# Patient Record
Sex: FEMALE | Race: WHITE | Employment: FULL TIME | URBAN - METROPOLITAN AREA
[De-identification: names, ages, dates, MRNs, and addresses within clinical notes are randomized per-mention and may not be internally consistent; named-entity substitution may affect disease eponyms.]

---

## 2017-04-12 ENCOUNTER — TELEPHONE (OUTPATIENT)
Dept: SURGERY | Facility: CLINIC | Age: 32
End: 2017-04-12

## 2017-04-12 NOTE — TELEPHONE ENCOUNTER
Linda is calling for Linh Bueno.  She reports Linh has treated her twice in the past for anal fissures.  Most recently June last year.  At that time was on diltiazem for 6 weeks and issue resolved.  She feels as of yesterday that her anal fissure is back.  She is requesting another Rx for diltiazem.  She's willing to come to clinic for appointment if need be, but she's confident that's what it is.    Will route to Linh Gomes's clinic nurse.

## 2017-04-13 DIAGNOSIS — K60.2 ANAL FISSURE: ICD-10-CM

## 2017-04-13 NOTE — TELEPHONE ENCOUNTER
Attempt to call patient, no answer, LM for patient that refill for her Diltiazem was sent to clinic pharmacy here ([address] [facility]). Patient to call back if she has any questions/concerns or if she would like rx to be sent somewhere else.   Maria L RODRIGUEZ LPN

## 2017-04-14 ENCOUNTER — TELEPHONE (OUTPATIENT)
Dept: SURGERY | Facility: CLINIC | Age: 32
End: 2017-04-14

## 2017-04-14 NOTE — TELEPHONE ENCOUNTER
Linda Madrid is a patient of URBANO Bullard last seen 06/14/16. Refill for Diltiazem sent to pharmacy 4/13/17 for current anal fisure. Patient is in contact with triage line with complaints of a rash that is located on her chest and bottom lip. Patient denies any other changes in daily routine and thinks this is due to the diltiazem cream. Bottom lip rash is raised and red, chest area is flat and itchy. She denies any signs of SOB or other complications at this time. Advised patient to stop using diltiazem, if she develops any worsening symptoms she should go into ED. Patient is scheduled to see URBANO Reynolds 4/18/17. Advised patient in the meantime to regulate BM, take sitz bath etc for comfort. She is in agreement with this plan and will call with any further questions or concerns.

## 2017-04-18 ENCOUNTER — OFFICE VISIT (OUTPATIENT)
Dept: SURGERY | Facility: CLINIC | Age: 32
End: 2017-04-18

## 2017-04-18 VITALS
OXYGEN SATURATION: 98 % | TEMPERATURE: 97.8 F | SYSTOLIC BLOOD PRESSURE: 116 MMHG | BODY MASS INDEX: 22.4 KG/M2 | DIASTOLIC BLOOD PRESSURE: 75 MMHG | HEIGHT: 66 IN | WEIGHT: 139.4 LBS | HEART RATE: 82 BPM

## 2017-04-18 DIAGNOSIS — K60.2 ANAL FISSURE: Primary | ICD-10-CM

## 2017-04-18 ASSESSMENT — PAIN SCALES - GENERAL: PAINLEVEL: MODERATE PAIN (4)

## 2017-04-18 NOTE — LETTER
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 She presents today for recurrent fissure.    Linda had been doing well until she developed another anal fissure a few weeks ago. She again has sharp pain with bowel movements. She uses Miralax at least twice a day to keep stools soft and has not had any hard stools. 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 She can feel a small bump on the outside of her anus. She got a refill from me for diltiazem last week. However, after three days of use, she developed hives on her chest. She was seen by her dermatologist who recommended stopping diltiazem. Hives have since resolved.   She denies any family history of any colon cancer. She has had a few colonoscopies in the past with the last in  with biopsies at that time.    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 On exam she has a posterior midline anal fissure. She also has a few small external hemorrhoids as well. Discussed trying a different topical medication as she has healed well with topical therapy in the past versus botox injections. She would like to try another topical medications. I am unsure if she reacted to the diltiazem or the lipovan it was compounded in. Will have her try nifedipine in lidocaine. If she tolerates this well, would like her to use three times a day for 8 weeks and return after 8 weeks if still symptomatic. If she does not tolerate, will consider Botox injections.  Discussed the risks of pain, infection, leakage of stool, mucous, or flatus, and continued risk of recurrent fissures with Botox injections. She states an understanding of this. Advised continued use of Miralax if this is controlling her constipation and she can also follow up with gastroenterology as needed. Advised not holding bowel movements for long periods of time if possible. Patient's questions were answered to her stated satisfaction and she is in agreement with this plan.    Answers for HPI/ROS submitted by the patient on 3/2/2016   General Symptoms: No  Skin Symptoms: No  HENT Symptoms: No  EYE SYMPTOMS: No  HEART SYMPTOMS: No  LUNG SYMPTOMS: No  INTESTINAL SYMPTOMS: Yes  URINARY SYMPTOMS: No  GYNECOLOGIC SYMPTOMS: No  BREAST SYMPTOMS: No  SKELETAL SYMPTOMS: No  BLOOD SYMPTOMS: No  NERVOUS SYSTEM SYMPTOMS: No  MENTAL HEALTH SYMPTOMS: No  Heart burn or indigestion: No  Nausea: No  Vomiting: No  Abdominal pain: No  Bloating: Yes  Constipation: No  Diarrhea: Yes  Blood in stool: No  Black stools: No  Rectal/ Anal pain: Yes  Fecal incontinence: No  Rectal bleeding: Yes  Yellowing of skin or eyes: No  Vomit with blood: No  Change in stools: No  Hemorrhoids: Yes      Medical history:  No past medical history on file.    Surgical history:  No past surgical history on file.    Problem list:    Patient Active Problem List    Diagnosis Date Noted     Slow transit constipation 06/22/2016     Priority: Medium       Medications:  Current Outpatient Prescriptions   Medication Sig Dispense Refill     OMEPRAZOLE PO        COMPOUND (CMPD RX) - PHARMACY TO MIX COMPOUNDED MEDICATION Apply a pea sized amount three times a day for 8 weeks. 30 g 0     diltiazem 2% in PLO cream, FV COMPOUNDED, 2% GEL To anal opening three times daily.  Use a pea-sized amount.  Store at room temperature. 60 g 2     Cetirizine HCl (ZYRTEC ALLERGY PO) Take by mouth daily PRN       dicyclomine (BENTYL) 10 MG capsule Take 1-2 capsules (10-20 mg) by  "mouth 2 times daily as needed 40 capsule 3     hyoscyamine (ANASPAZ,LEVSIN) 0.125 MG tablet Take 1-2 tablets (125-250 mcg) by mouth every 4 hours as needed for cramping 40 tablet 1     linaclotide (LINZESS) 290 MCG capsule Take 1 capsule (290 mcg) by mouth every morning (before breakfast) 30 capsule 1     polyethylene glycol (MIRALAX) powder Take 2 capfuls by mouth daily        Fexofenadine HCl (ALLEGRA PO) Take by mouth daily       FLUTICASONE PROPIONATE, NASAL, NA        JUNEL FE 1.5/30 1.5-30 MG-MCG OR TABS 1 TABLET DAILY       ALBUTEROL 90 MCG/ACT IN AERS PRN         Allergies:  Allergies   Allergen Reactions     Milk Protein Extract Other (See Comments)     Intense pain within 15 minutes.      Ancef [Cefazolin Sodium]      Penicillins      Shellfish Allergy      Vicodin [Hydrocodone-Acetaminophen]        Family history:  Family History   Problem Relation Age of Onset     Cancer - colorectal No family hx of      Other - See Comments No family hx of      autoimmune disease       Social history:  Social History   Substance Use Topics     Smoking status: Never Smoker     Smokeless tobacco: Not on file     Alcohol use Yes    Marital status: single.  Occupation: Speech pathologist.    Nursing Notes:   Mireya Beltran LPN  4/18/2017  8:10 AM  Signed  Chief Complaint   Patient presents with     Clinic Care Coordination - Follow-up     return       Vitals:    04/18/17 0808   BP: 116/75   Pulse: 82   Temp: 97.8  F (36.6  C)   TempSrc: Oral   SpO2: 98%   Weight: 139 lb 6.4 oz   Height: 5' 6\"       Body mass index is 22.5 kg/(m^2).    Mireya PHIPPS LPN                          Physical Examination:  /75  Pulse 82  Temp 97.8  F (36.6  C) (Oral)  Ht 5' 6\"  Wt 139 lb 6.4 oz  SpO2 98%  BMI 22.5 kg/m2  General: Alert, oriented, in no acute distress but appears uncomfortable  HEENT: Mucous membranes moist  Perianal external examination:  Perianal skin: Intact with no excoriation or lichenification.  Lesions: No evidence of " an external lesion, nodularity, or induration in the perianal region.  Eversion of buttocks: There was evidence of an anal fissure. Details: Posterior midline anal fissure  Skin tags or external hemorrhoids: Yes: Soft, edematous hemorrhoid in the left posterior and right anterior position without bleeding or thrombosis.  Digital rectal examination: Was deferred in order not to cause further trauma.    Anoscopy: Was deferred in order not to cause further trauma.    Total face to face time was 15 minutes, >50% counseling.    HELEN Freitas, NP-C  Colon and Rectal Surgery   [facility]        HELEN Freitas CNP

## 2017-04-18 NOTE — NURSING NOTE
"Chief Complaint   Patient presents with     Clinic Care Coordination - Follow-up     return       Vitals:    04/18/17 0808   BP: 116/75   Pulse: 82   Temp: 97.8  F (36.6  C)   TempSrc: Oral   SpO2: 98%   Weight: 139 lb 6.4 oz   Height: 5' 6\"       Body mass index is 22.5 kg/(m^2).    Mireya PHIPPS LPN                       "

## 2017-04-18 NOTE — PROGRESS NOTES
Colon and Rectal Surgery Follow Up Clinic Note    Referring provider:  Juanita Rdz MD  [facility]  [address]  [place] [address]     RE: Linda Madrid  : 1985  KEVIN: 2017    Linda Madrid is a very pleasant 30 year old female with recurrent anal fissure who was seen last in 2016. She presents today for recurrent fissure.    Linda had been doing well until she developed another anal fissure a few weeks ago. She again has sharp pain with bowel movements. She uses Miralax at least twice a day to keep stools soft and has not had any hard stools. 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 She can feel a small bump on the outside of her anus. She got a refill from me for diltiazem last week. However, after three days of use, she developed hives on her chest. She was seen by her dermatologist who recommended stopping diltiazem. Hives have since resolved.   She denies any family history of any colon cancer. She has had a few colonoscopies in the past with the last in  with biopsies at that time.    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 On exam she has a posterior midline anal fissure. She also has a few small external hemorrhoids as well. Discussed trying a different topical medication as she has healed well with topical therapy in the past versus botox injections. She would like to try another topical medications. I am unsure if she reacted to the diltiazem or the lipovan it was compounded in. Will have her try nifedipine in lidocaine. If she tolerates this well, would like her to use three times a day for 8 weeks and return after 8 weeks if still symptomatic. If she does not tolerate, will consider Botox injections. Discussed the risks of pain, infection, leakage of stool, mucous, or flatus, and continued  risk of recurrent fissures with Botox injections. She states an understanding of this. Advised continued use of Miralax if this is controlling her constipation and she can also follow up with gastroenterology as needed. Advised not holding bowel movements for long periods of time if possible. Patient's questions were answered to her stated satisfaction and she is in agreement with this plan.    Answers for HPI/ROS submitted by the patient on 3/2/2016   General Symptoms: No  Skin Symptoms: No  HENT Symptoms: No  EYE SYMPTOMS: No  HEART SYMPTOMS: No  LUNG SYMPTOMS: No  INTESTINAL SYMPTOMS: Yes  URINARY SYMPTOMS: No  GYNECOLOGIC SYMPTOMS: No  BREAST SYMPTOMS: No  SKELETAL SYMPTOMS: No  BLOOD SYMPTOMS: No  NERVOUS SYSTEM SYMPTOMS: No  MENTAL HEALTH SYMPTOMS: No  Heart burn or indigestion: No  Nausea: No  Vomiting: No  Abdominal pain: No  Bloating: Yes  Constipation: No  Diarrhea: Yes  Blood in stool: No  Black stools: No  Rectal/ Anal pain: Yes  Fecal incontinence: No  Rectal bleeding: Yes  Yellowing of skin or eyes: No  Vomit with blood: No  Change in stools: No  Hemorrhoids: Yes      Medical history:  No past medical history on file.    Surgical history:  No past surgical history on file.    Problem list:    Patient Active Problem List    Diagnosis Date Noted     Slow transit constipation 06/22/2016     Priority: Medium       Medications:  Current Outpatient Prescriptions   Medication Sig Dispense Refill     OMEPRAZOLE PO        COMPOUND (CMPD RX) - PHARMACY TO MIX COMPOUNDED MEDICATION Apply a pea sized amount three times a day for 8 weeks. 30 g 0     diltiazem 2% in PLO cream, FV COMPOUNDED, 2% GEL To anal opening three times daily.  Use a pea-sized amount.  Store at room temperature. 60 g 2     Cetirizine HCl (ZYRTEC ALLERGY PO) Take by mouth daily PRN       dicyclomine (BENTYL) 10 MG capsule Take 1-2 capsules (10-20 mg) by mouth 2 times daily as needed 40 capsule 3     hyoscyamine (ANASPAZ,LEVSIN) 0.125 MG tablet  "Take 1-2 tablets (125-250 mcg) by mouth every 4 hours as needed for cramping 40 tablet 1     linaclotide (LINZESS) 290 MCG capsule Take 1 capsule (290 mcg) by mouth every morning (before breakfast) 30 capsule 1     polyethylene glycol (MIRALAX) powder Take 2 capfuls by mouth daily        Fexofenadine HCl (ALLEGRA PO) Take by mouth daily       FLUTICASONE PROPIONATE, NASAL, NA        JUNEL FE 1.5/30 1.5-30 MG-MCG OR TABS 1 TABLET DAILY       ALBUTEROL 90 MCG/ACT IN AERS PRN         Allergies:  Allergies   Allergen Reactions     Milk Protein Extract Other (See Comments)     Intense pain within 15 minutes.      Ancef [Cefazolin Sodium]      Penicillins      Shellfish Allergy      Vicodin [Hydrocodone-Acetaminophen]        Family history:  Family History   Problem Relation Age of Onset     Cancer - colorectal No family hx of      Other - See Comments No family hx of      autoimmune disease       Social history:  Social History   Substance Use Topics     Smoking status: Never Smoker     Smokeless tobacco: Not on file     Alcohol use Yes    Marital status: single.  Occupation: Speech pathologist.    Nursing Notes:   Mireya Beltran LPN  4/18/2017  8:10 AM  Signed  Chief Complaint   Patient presents with     Clinic Care Coordination - Follow-up     return       Vitals:    04/18/17 0808   BP: 116/75   Pulse: 82   Temp: 97.8  F (36.6  C)   TempSrc: Oral   SpO2: 98%   Weight: 139 lb 6.4 oz   Height: 5' 6\"       Body mass index is 22.5 kg/(m^2).    Mireya PHIPPS LPN                          Physical Examination:  /75  Pulse 82  Temp 97.8  F (36.6  C) (Oral)  Ht 5' 6\"  Wt 139 lb 6.4 oz  SpO2 98%  BMI 22.5 kg/m2  General: Alert, oriented, in no acute distress but appears uncomfortable  HEENT: Mucous membranes moist  Perianal external examination:  Perianal skin: Intact with no excoriation or lichenification.  Lesions: No evidence of an external lesion, nodularity, or induration in the perianal region.  Eversion of " buttocks: There was evidence of an anal fissure. Details: Posterior midline anal fissure  Skin tags or external hemorrhoids: Yes: Soft, edematous hemorrhoid in the left posterior and right anterior position without bleeding or thrombosis.  Digital rectal examination: Was deferred in order not to cause further trauma.    Anoscopy: Was deferred in order not to cause further trauma.    Total face to face time was 15 minutes, >50% counseling.    HELEN Freitas, NP-C  Colon and Rectal Surgery   [facility]

## 2017-04-18 NOTE — MR AVS SNAPSHOT
"              After Visit Summary   4/18/2017    Linda Madrid    MRN: [id number]           Patient Information     Date Of Birth          1985        Visit Information        Provider Department      4/18/2017 8:00 AM Linh Gómez APRN [facility] Colon and Rectal Surgery        Today's Diagnoses     Anal fissure    -  1       Follow-ups after your visit        Who to contact     Please call your clinic at [phone] to:    Ask questions about your health    Make or cancel appointments    Discuss your medicines    Learn about your test results    Speak to your doctor   If you have compliments or concerns about an experience at your clinic, or if you wish to file a complaint, please contact [facility] Patient Relations at [phone] or email us at [email or web address]         Additional Information About Your Visit        MyChart Information     [employer] gives you secure access to your electronic health record. If you see a primary care provider, you can also send messages to your care team and make appointments. If you have questions, please call your primary care clinic.  If you do not have a primary care provider, please call [phone] and they will assist you.      [employer] is an electronic gateway that provides easy, online access to your medical records. With [employer], you can request a clinic appointment, read your test results, renew a prescription or communicate with your care team.     To access your existing account, please contact your [facility] Clinic or call [phone] for assistance.        Care EveryWhere ID     This is your Care EveryWhere ID. This could be used by other organizations to access your [place] medical records  [id number]        Your Vitals Were     Pulse Temperature Height Pulse Oximetry BMI (Body Mass Index)       82 97.8  F (36.6  C) (Oral) 5' 6\" 98% 22.5 kg/m2        Blood Pressure " from Last 3 Encounters:   04/18/17 116/75   06/22/16 104/68   06/14/16 105/71    Weight from Last 3 Encounters:   04/18/17 139 lb 6.4 oz   06/22/16 136 lb   06/14/16 134 lb 9.6 oz              Today, you had the following     No orders found for display         Today's Medication Changes          These changes are accurate as of: 4/18/17  8:50 AM.  If you have any questions, ask your nurse or doctor.               Start taking these medicines.        Dose/Directions    COMPOUND - PHARMACY TO MIX COMPOUNDED MEDICATION   Commonly known as:  CMPD RX   Used for:  Anal fissure   Started by:  Linh Gómez APRN CNP        Apply a pea sized amount three times a day for 8 weeks.   Quantity:  30 g   Refills:  0            Where to get your medicines      These medications were sent to [facility] [address] [place] [address] [address] [address]    Hours:  TRANSPLANT PHONE NUMBER [phone] Phone:  [phone]     COMPOUND - PHARMACY TO MIX COMPOUNDED MEDICATION                Primary Care Provider Office Phone # Fax #    Juanita Rdz [phone]       [facility] [address] [place] [address] [address]  [facility] [address]        Thank you!     Thank you for choosing [facility] COLON AND RECTAL SURGERY  for your care. Our goal is always to provide you with excellent care. Hearing back from our patients is one way we can continue to improve our services. Please take a few minutes to complete the written survey that you may receive in the mail after your visit with us. Thank you!             Your Updated Medication List - Protect others around you: Learn how to safely use, store and throw away your medicines at www.disposemymeds.org.          This list is accurate as of: 4/18/17  8:50 AM.  Always use your most recent med list.                   Brand Name Dispense Instructions for use    albuterol 90 MCG/ACT inhaler       PRN       ALLEGRA PO      Take by mouth daily       COMPOUND - PHARMACY TO MIX COMPOUNDED MEDICATION    CMPD RX    30 g    Apply a pea sized amount three times a day for 8 weeks.       dicyclomine 10 MG capsule    BENTYL    40 capsule    Take 1-2 capsules (10-20 mg) by mouth 2 times daily as needed       diltiazem 2% in PLO cream (FV COMPOUNDED) 2% Gel     60 g    To anal opening three times daily.  Use a pea-sized amount.  Store at room temperature.       FLUTICASONE PROPIONATE (NASAL) NA          hyoscyamine 0.125 MG tablet    ANASPAZ/LEVSIN    40 tablet    Take 1-2 tablets (125-250 mcg) by mouth every 4 hours as needed for cramping       JUNEL FE 1.5/30 1.5-30 MG-MCG per tablet   Generic drug:  norethindrone-ethinyl estradiol-iron      1 TABLET DAILY       linaclotide 290 MCG capsule    LINZESS    30 capsule    Take 1 capsule (290 mcg) by mouth every morning (before breakfast)       MIRALAX powder   Generic drug:  polyethylene glycol      Take 2 capfuls by mouth daily       OMEPRAZOLE PO          ZYRTEC ALLERGY PO      Take by mouth daily PRN

## 2017-06-24 ENCOUNTER — HEALTH MAINTENANCE LETTER (OUTPATIENT)
Age: 32
End: 2017-06-24

## 2017-12-05 ENCOUNTER — TRANSFERRED RECORDS (OUTPATIENT)
Dept: HEALTH INFORMATION MANAGEMENT | Facility: CLINIC | Age: 32
End: 2017-12-05

## 2018-12-12 ENCOUNTER — TELEPHONE (OUTPATIENT)
Dept: SURGERY | Facility: CLINIC | Age: 33
End: 2018-12-12

## 2018-12-12 DIAGNOSIS — K60.2 ANAL FISSURE: ICD-10-CM

## 2018-12-12 NOTE — TELEPHONE ENCOUNTER
[facility] Call Center    Phone Message    May a detailed message be left on voicemail: yes    Reason for Call: Medication Question or concern regarding medication   Prescription Clarification  Name of Medication: diltiazem 2% in PLO cream, FV COMPOUNDED, 2% GEL or Compound CMPD RX  Prescribing Provider: Aly Conte   Pharmacy:    What on the order needs clarification? Pt has had the same symptoms as in 2017 and is wondering if a script can be filled for her since she is going out of town. She said she was put on one cream that she had a reaction to and then put on another. Not sure if the above two were them?          Action Taken: Message routed to:  [facility] ([facility] Please call the Pt to discuss and confirm

## 2018-12-20 NOTE — TELEPHONE ENCOUNTER
Patient return call, discuss with patient her prescription for the diltiazem cream. Gave patient the option to have compound medication mixed in with Lipovan cream or White pet to be cheaper in cost. Patient says the cost they quoted her was $180.    Called [facility], if mixed with lipovan cream it's about $72 and white petroleum is $39.     Patient states she doesn't mind trying the white pet. Inform her, I will call to change the prescription to white petroleum.     Called [facility] pharmacy to change RX, they will contact patient to set up payment and mail out prescription to her.     Regarding her other symptoms, appointment made for her to see Linh Bueno NP on January 16, 2019 @ 6:30pm. Patient advise if symptoms becomes worst, she starts having fever/chills/nausea/vomiting, to call us back or go to ER. Patient states understanding of plan.     Maria L RODRIGUEZ LPN

## 2018-12-20 NOTE — TELEPHONE ENCOUNTER
[facility] Call Center    Phone Message    May a detailed message be left on voicemail: yes    Reason for Call: Other: Pt calling to say that her insurance does not cover the compound and needs a different Rx.  She is also having new symptoms:  as of yesterday, a lot of blood and blood clots.  today is light bleeding.  Please call her back as soon possible to discuss .  She is also wondering if she should be seen due to the clotting    Action Taken: Message routed to:  [facility] ([facility]): NILDA MNEDOZA

## 2018-12-20 NOTE — TELEPHONE ENCOUNTER
Called patient, no answer, LM for patient stating majority of the time, insurance will not cover the diltiazem cream because of the indication of the medication. A PA can be submitted to see if they will cover, but she may have to pay out of pocket for the medication. Also regarding her symptoms, she should be seen due to not being seen for over a year and her symptoms are new. Patient to call back and discuss and to schedule an appointment to see Linh Bueno NP.    Maria L RODRIGUEZ LPN

## 2018-12-26 ENCOUNTER — TELEPHONE (OUTPATIENT)
Dept: SURGERY | Facility: CLINIC | Age: 33
End: 2018-12-26

## 2019-01-15 ENCOUNTER — TELEPHONE (OUTPATIENT)
Dept: SURGERY | Facility: CLINIC | Age: 34
End: 2019-01-15

## 2019-01-15 NOTE — TELEPHONE ENCOUNTER
Called patient to offer earlier appointment time at 5:45 instead of 6:30.Patient answered phone call and details of future appointment were discussed. The patient stated that they are in a meeting until 6:00 and cannot make it any earlier.   Rebecca English, EMT

## 2019-01-16 ENCOUNTER — OFFICE VISIT (OUTPATIENT)
Dept: SURGERY | Facility: CLINIC | Age: 34
End: 2019-01-16
Payer: COMMERCIAL

## 2019-01-16 DIAGNOSIS — K59.09 OTHER CONSTIPATION: ICD-10-CM

## 2019-01-16 DIAGNOSIS — K60.2 ANAL FISSURE: Primary | ICD-10-CM

## 2019-01-16 NOTE — PROGRESS NOTES
Colon and Rectal Surgery Follow Up Clinic Note    Referring provider:  Juanita Rdz MD  [facility]  [address]  [place] [address]     RE: Linda Madrid  : 1985  KEVIN: 2019    Linda Madrid is a very pleasant 30 year old female with recurrent anal fissure who was seen last in 2017. She presents today for recurrent fissure.    Linda had been doing well until she developed another anal fissure about a week before Thanks. She again has sharp pain with bowel movements.  She contacted our clinic and we represcribed topical therapy.  She did not tolerate diltiazem previously so needed nifedipine, which she has been tolerating well.  She has been using this 3 times a day since the end of December and over the past 3 days her pain has improved.  She has had some bright red blood and noticed blood clots twice but this has improved.  She continues to have a urgency and significant constipation.  She has seen Sirisha Veronica NP in the past and [facility] for this.  She has tried Linzess and is on daily MiraLAX but continues to have significant constipation.    She denies any family history of any colon cancer. She has had a few colonoscopies in the past with the last in  with biopsies at that time.    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 On exam she has a posterior midline anal fissure.  Discussed that she likely continues to get anal fissures because of her uncontrolled constipation.  I would like her to meet with gastroenterology here to try to manage this better.  We will have her continue on topical nifedipine cream as her symptoms are improving.  If she fails to heal with topical therapy, can consider Botox injections.  We will have her return to clinic in 6 weeks for any persistent symptoms.  She is concerned that she also has internal hemorrhoids that there may prolapse a little with bowel movements.  We can  evaluate at that time for possible internal hemorrhoids if she remains symptomatic. Patient's questions were answered to her stated satisfaction and she is in agreement with this plan.    Answers for HPI/ROS submitted by the patient on 3/2/2016   General Symptoms: No  Skin Symptoms: No  HENT Symptoms: No  EYE SYMPTOMS: No  HEART SYMPTOMS: No  LUNG SYMPTOMS: No  INTESTINAL SYMPTOMS: Yes  URINARY SYMPTOMS: No  GYNECOLOGIC SYMPTOMS: No  BREAST SYMPTOMS: No  SKELETAL SYMPTOMS: No  BLOOD SYMPTOMS: No  NERVOUS SYSTEM SYMPTOMS: No  MENTAL HEALTH SYMPTOMS: No  Heart burn or indigestion: No  Nausea: No  Vomiting: No  Abdominal pain: No  Bloating: Yes  Constipation: No  Diarrhea: Yes  Blood in stool: No  Black stools: No  Rectal/ Anal pain: Yes  Fecal incontinence: No  Rectal bleeding: Yes  Yellowing of skin or eyes: No  Vomit with blood: No  Change in stools: No  Hemorrhoids: Yes      Medical history:  No past medical history on file.    Surgical history:  No past surgical history on file.    Problem list:    Patient Active Problem List    Diagnosis Date Noted     Slow transit constipation 06/22/2016     Priority: Medium       Medications:  Current Outpatient Medications   Medication Sig Dispense Refill     ALBUTEROL 90 MCG/ACT IN AERS PRN       Cetirizine HCl (ZYRTEC ALLERGY PO) Take by mouth daily PRN       COMPOUND (CMPD RX) - PHARMACY TO MIX COMPOUNDED MEDICATION Apply a pea sized amount three times a day for 8 weeks. 30 g 0     dicyclomine (BENTYL) 10 MG capsule Take 1-2 capsules (10-20 mg) by mouth 2 times daily as needed 40 capsule 3     diltiazem 2% in PLO cream, FV COMPOUNDED, 2% GEL To anal opening three times daily.  Use a pea-sized amount.  Store at room temperature. 60 g 2     Fexofenadine HCl (ALLEGRA PO) Take by mouth daily       FLUTICASONE PROPIONATE, NASAL, NA        hyoscyamine (ANASPAZ,LEVSIN) 0.125 MG tablet Take 1-2 tablets (125-250 mcg) by mouth every 4 hours as needed for cramping 40 tablet 1      JUNEL FE 1.5/30 1.5-30 MG-MCG OR TABS 1 TABLET DAILY       linaclotide (LINZESS) 290 MCG capsule Take 1 capsule (290 mcg) by mouth every morning (before breakfast) 30 capsule 1     OMEPRAZOLE PO        polyethylene glycol (MIRALAX) powder Take 2 capfuls by mouth daily          Allergies:  Allergies   Allergen Reactions     Milk Protein Extract Other (See Comments)     Intense pain within 15 minutes.      Ancef [Cefazolin Sodium]      Penicillins      Shellfish Allergy      Vicodin [Hydrocodone-Acetaminophen]        Family history:  Family History   Problem Relation Age of Onset     Cancer - colorectal No family hx of      Other - See Comments No family hx of         autoimmune disease       Social history:  Social History     Tobacco Use     Smoking status: Never Smoker   Substance Use Topics     Alcohol use: Yes    Marital status: single.  Occupation: Speech pathologist.    Nursing Notes:   Rebecca English EMT  1/16/2019  6:47 PM  Signed  Chief Complaint   Patient presents with     Rectal Problem     Rectal bleeding, hx of anal fissure.       There were no vitals filed for this visit.  Per provider.   There is no height or weight on file to calculate BMI.     MARYJANE Tony                         Physical Examination:  There were no vitals taken for this visit.  General: Alert, oriented, in no acute distress but appears uncomfortable  HEENT: Mucous membranes moist  Perianal external examination:  Perianal skin: Intact with no excoriation or lichenification.  Lesions: No evidence of an external lesion, nodularity, or induration in the perianal region.  Eversion of buttocks: There was evidence of an anal fissure. Details: Posterior midline anal fissure  Skin tags or external hemorrhoids: Small external skin tag in the left anterior position  Digital rectal examination: Was deferred in order not to cause further trauma.    Anoscopy: Was deferred in order not to cause further trauma.    Total face to face  time was 25 minutes, >50% counseling.    HELEN Freitas, NP-C  Colon and Rectal Surgery   [facility]

## 2019-01-16 NOTE — LETTER
2019       RE: Linda Madrid  [address]  [facility] [address]     Dear Colleague,    Thank you for referring your patient, Linda Madrid, to the [facility] COLON AND RECTAL SURGERY at [facility]. Please see a copy of my visit note below.    Colon and Rectal Surgery Follow Up Clinic Note    Referring provider:  Juanita Rdz MD  [facility]  [address]  [place] [address]     RE: Linda Madrid  : 1985  KEVIN: 2019    Linda Madrid is a very pleasant 30 year old female with recurrent anal fissure who was seen last in 2017. She presents today for recurrent fissure.    Linda had been doing well until she developed another anal fissure about a week before . She again has sharp pain with bowel movements.  She contacted our clinic and we represcribed topical therapy.  She did not tolerate diltiazem previously so needed nifedipine, which she has been tolerating well.  She has been using this 3 times a day since the end of December and over the past 3 days her pain has improved.  She has had some bright red blood and noticed blood clots twice but this has improved.  She continues to have a urgency and significant constipation.  She has seen Sirisha Veronica NP in the past and [facility] for this.  She has tried Linzess and is on daily MiraLAX but continues to have significant constipation.    She denies any family history of any colon cancer. She has had a few colonoscopies in the past with the last in  with biopsies at that time.    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 On exam she has a posterior midline anal fissure.  Discussed that she likely continues to get anal fissures because of her uncontrolled constipation.  I would like her to meet with gastroenterology here to try to manage this better.  We will have her continue on topical nifedipine cream as her  symptoms are improving.  If she fails to heal with topical therapy, can consider Botox injections.  We will have her return to clinic in 6 weeks for any persistent symptoms.  She is concerned that she also has internal hemorrhoids that there may prolapse a little with bowel movements.  We can evaluate at that time for possible internal hemorrhoids if she remains symptomatic. Patient's questions were answered to her stated satisfaction and she is in agreement with this plan.    Medical history:  No past medical history on file.    Surgical history:  No past surgical history on file.    Problem list:    Patient Active Problem List    Diagnosis Date Noted     Slow transit constipation 06/22/2016     Priority: Medium       Medications:  Current Outpatient Medications   Medication Sig Dispense Refill     ALBUTEROL 90 MCG/ACT IN AERS PRN       Cetirizine HCl (ZYRTEC ALLERGY PO) Take by mouth daily PRN       COMPOUND (CMPD RX) - PHARMACY TO MIX COMPOUNDED MEDICATION Apply a pea sized amount three times a day for 8 weeks. 30 g 0     dicyclomine (BENTYL) 10 MG capsule Take 1-2 capsules (10-20 mg) by mouth 2 times daily as needed 40 capsule 3     diltiazem 2% in PLO cream, FV COMPOUNDED, 2% GEL To anal opening three times daily.  Use a pea-sized amount.  Store at room temperature. 60 g 2     Fexofenadine HCl (ALLEGRA PO) Take by mouth daily       FLUTICASONE PROPIONATE, NASAL, NA        hyoscyamine (ANASPAZ,LEVSIN) 0.125 MG tablet Take 1-2 tablets (125-250 mcg) by mouth every 4 hours as needed for cramping 40 tablet 1     JUNEL FE 1.5/30 1.5-30 MG-MCG OR TABS 1 TABLET DAILY       linaclotide (LINZESS) 290 MCG capsule Take 1 capsule (290 mcg) by mouth every morning (before breakfast) 30 capsule 1     OMEPRAZOLE PO        polyethylene glycol (MIRALAX) powder Take 2 capfuls by mouth daily          Allergies:  Allergies   Allergen Reactions     Milk Protein Extract Other (See Comments)     Intense pain within 15 minutes.       Ancef [Cefazolin Sodium]      Penicillins      Shellfish Allergy      Vicodin [Hydrocodone-Acetaminophen]        Family history:  Family History   Problem Relation Age of Onset     Cancer - colorectal No family hx of      Other - See Comments No family hx of         autoimmune disease       Social history:  Social History     Tobacco Use     Smoking status: Never Smoker   Substance Use Topics     Alcohol use: Yes    Marital status: single.  Occupation: Speech pathologist.    Nursing Notes:   Rebecca English EMT  1/16/2019  6:47 PM  Signed  Chief Complaint   Patient presents with     Rectal Problem     Rectal bleeding, hx of anal fissure.       There were no vitals filed for this visit.  Per provider.   There is no height or weight on file to calculate BMI.     MARYJANE Tony                         Physical Examination:  There were no vitals taken for this visit.  General: Alert, oriented, in no acute distress but appears uncomfortable  HEENT: Mucous membranes moist  Perianal external examination:  Perianal skin: Intact with no excoriation or lichenification.  Lesions: No evidence of an external lesion, nodularity, or induration in the perianal region.  Eversion of buttocks: There was evidence of an anal fissure. Details: Posterior midline anal fissure  Skin tags or external hemorrhoids: Small external skin tag in the left anterior position  Digital rectal examination: Was deferred in order not to cause further trauma.    Anoscopy: Was deferred in order not to cause further trauma.    Total face to face time was 25 minutes, >50% counseling.    HELEN Freitas, NP-C  Colon and Rectal Surgery   [facility]

## 2019-01-17 NOTE — NURSING NOTE
Chief Complaint   Patient presents with     Rectal Problem     Rectal bleeding, hx of anal fissure.       There were no vitals filed for this visit.  Per provider.   There is no height or weight on file to calculate BMI.     Rebecca English, EMT

## 2019-01-25 ENCOUNTER — TELEPHONE (OUTPATIENT)
Dept: GASTROENTEROLOGY | Facility: CLINIC | Age: 34
End: 2019-01-25

## 2019-01-25 NOTE — TELEPHONE ENCOUNTER
FUTURE VISIT INFORMATION      FUTURE VISIT INFORMATION:    Date: 1/28/19    Time: 4PM    [place] [facility]  REFERRAL INFORMATION:    Referring provider:  Linh Lopez    Referring providers clinic:  [facility]    Reason for visit/diagnosis: IBS & Constipation    NOTES STATUS DETAILS   OFFICE NOTE from referring provider Internal 1/16/19, 4/18/17, 6/14/16, 3/2/16   OFFICE NOTE from other specialist Internal 6/22/16, 9/30/14, 7/22/14   DISCHARGE SUMMARY from hospital Internal    OPERATIVE REPORT N/A    MEDICATION LIST Internal         ENDOSCOPY  Internal 1/18/10, 10/2/08   COLONOSCOPY Internal 1/18/10, 10/2/08   ERCP N/A    EUS N/A    STOOL TESTING Internal    PERTINENT LABS Internal    PATHOLOGY REPORTS (RELATED) Internal    IMAGING (CT, MRI, EGD) Internal/Care Everywhere PACS     1/25/19: CLM with [facility] asking for CT and USs to be pushed. -Neelam  1/25/19: Sent fax to [facility] requesting records. -Neelam  1/28/19: Received records from [facility]

## 2019-01-28 ENCOUNTER — PRE VISIT (OUTPATIENT)
Dept: GASTROENTEROLOGY | Facility: CLINIC | Age: 34
End: 2019-01-28

## 2019-01-28 ENCOUNTER — OFFICE VISIT (OUTPATIENT)
Dept: GASTROENTEROLOGY | Facility: CLINIC | Age: 34
End: 2019-01-28
Payer: COMMERCIAL

## 2019-01-28 VITALS
DIASTOLIC BLOOD PRESSURE: 70 MMHG | BODY MASS INDEX: 24.09 KG/M2 | WEIGHT: 149.9 LBS | HEART RATE: 68 BPM | HEIGHT: 66 IN | SYSTOLIC BLOOD PRESSURE: 108 MMHG | OXYGEN SATURATION: 100 %

## 2019-01-28 DIAGNOSIS — K59.00 CONSTIPATION, UNSPECIFIED CONSTIPATION TYPE: Primary | ICD-10-CM

## 2019-01-28 DIAGNOSIS — R14.0 ABDOMINAL BLOATING: ICD-10-CM

## 2019-01-28 DIAGNOSIS — K59.00 CONSTIPATION, UNSPECIFIED CONSTIPATION TYPE: ICD-10-CM

## 2019-01-28 DIAGNOSIS — R10.9 ABDOMINAL DISCOMFORT: ICD-10-CM

## 2019-01-28 LAB — TSH SERPL DL<=0.005 MIU/L-ACNC: 1.89 MU/L (ref 0.4–4)

## 2019-01-28 RX ORDER — CALCIUM CARBONATE/VITAMIN D3 500-10/5ML
400 LIQUID (ML) ORAL DAILY
Qty: 90 CAPSULE | Refills: 3 | Status: SHIPPED | OUTPATIENT
Start: 2019-01-28 | End: 2020-01-28

## 2019-01-28 ASSESSMENT — ENCOUNTER SYMPTOMS
BLOOD IN STOOL: 1
HEARTBURN: 0
BOWEL INCONTINENCE: 0
DIARRHEA: 1
NAUSEA: 1
JAUNDICE: 0
BLOATING: 1
RECTAL PAIN: 1
CONSTIPATION: 1
ABDOMINAL PAIN: 1
VOMITING: 0

## 2019-01-28 ASSESSMENT — MIFFLIN-ST. JEOR: SCORE: 1401.69

## 2019-01-28 ASSESSMENT — PAIN SCALES - GENERAL: PAINLEVEL: NO PAIN (0)

## 2019-01-28 NOTE — PROGRESS NOTES
[facility]    CC/REFERRING MD:  Linh Ferrara  REASON FOR CONSULTATION: constipation     ASSESSMENT/PLAN:  Linda Madrid is a 33 year old woman with a past medical history of long-standing constipation and diagnosis of irritable bowel syndrome through [facility] in the [facility], anal fissure presenting today for follow-up.     1. Constipation, bloating, abdominal discomfort   Patient with long-standing constipation and GI symptoms likely caused by irritable bowel syndrome constipation.  Will ensure that patient has normal thyroid functioning today with TSH.  We discussed avenues for treating constipation including continuing to manage with MiraLAX 2-3 capfuls a day.  Patient is wondering about other options due to concern of cost and difficulty traveling with MiraLAX.  We discussed another option would be magnesium oxide which would be pill form and the patient is interested in trying at this point.  We will start the patient with magnesium oxide 400 mg a day while continuing MiraLAX, with plan to discontinue MiraLAX pending response.  She was instructed to contact our clinic with updates regarding magnesium oxide so that we can help titrate the medication.  Patient has been on Linzess in the past which she states worsens abdominal pain and did not improve stool form or consistency.  Has also tried Levsin and Bentyl which did not improve symptoms.  We discussed the other treatments for constipation in pill form included amitiza so which she has not been on.  Patient was given information regarding medication and we discussed potential side effects including loose stools.  Will discuss again at next visit.  Due to history of biofeedback therapy years ago, would be concerned that patient has underlying pelvic floor dysfunction.  I offered to refer the patient again for additional evaluation/treatment however the patient did not find biofeedback therapy helpful and would not like  to pursue this at this time. Patient can continue to follow dietary triggers that she has identified, with consideration of meeting with our dietitian if symptoms persist. Will discuss possible allergens at follow-up appointment.   -- check the TSH with labs   -- continue Miralax 2-3 capfuls a day  -- start magnesium oxide (pill form)- 400 mg a day and then back-off Miralax  -- can take up to 1000 mg a day of magnesium, call us with updates     2.  Rectal bleeding  Patient notes continued bright red blood per rectum associated with bowel movements.  Currently being treated by Linh from colorectal surgery.      3. Prior istory of Grade B esophagitis   Patient continues on omeprazole over the counter. Discussed that she could try to taper medication and monitor symptoms, otherwise will discuss this at follow-up appointment. Will contact our clinic if her symptoms worsen.      RTC 3 months    Thank you for this consultation. Patient discussed in brief with Dr. Chairez.   It was a pleasure to participate in the care of this patient; please contact us with any further questions.       Hailey Hunter PA-C  Division of Gastroenterology, Hepatology & Nutrition  [facility]        HPI  Linda Madrid is a 33 year old woman with a past medical history of long-standing constipation and diagnosis of irritable bowel syndrome through [facility] in the [facility], anal fissure presenting today for follow-up. She had previously been seen by Dr. Love and Prerna Bella NP and this is my first encounter with the patient.  Today the patient notes her entire life she has struggled with constipation and stated it would be common for her to go 1-2 weeks without a bowel movement. She has had workup in the past including pelvic floor evaluation and biofeedback therapy which she did not find helpful. She has also been seen by multiple providers including the [facility].  At her most recent visit in  2016, the patient was recommended to continue miralax and try linactolide in addition to miralax. Patient has also recently been seen by Linh Gómez for anal fissures which she has been treating with nifedipine with consideration of botox if symptoms worsen.     Previous workup has invluded to following:   TTG IgA normal 2008  EGD: normal esophagus, normal stomach, normal duodenum  Flex sig:  normal in 2008  Abdominal US 2016: mildl contracted gallbladder with minimal sludge  2016 EGD for abdominal pain- GRADE B esophagitis, normal stomach and duodneum- (told to continue dexilant)   HIDA scan: normal     Today the patient notes constant bloating and distention which she can sometimes improve with a bowel movement.  If she has a large bowel movement, sometimes she will have worsening cramping after bowel movement.  It can also improve with laying down.  She has tried dietary modifications and notes that dairy makes symptoms worse, and has identified certain food groups including polyols from the low 5 map diet also worsen symptoms.  She tries to eat smaller more frequent meals, avoidance, movements carbonated beverages, but notes that symptoms are worsening not improving.  In regards to her bowel movements she is currently taking 2-3 capfuls of MiraLAX a day and having Carter scale 6-7 bowel movements 1 time a day.  Patient reports that current dose of MiraLAX allows her to have daily bowel movements which prevents worsening abdominal discomfort due to constipation.  She denies any incontinence but notes some urgency with bowel movements.  She has been on this regimen for many years and has tried Linzess in the past which made abdominal pain worse and did not improve frequency or consistency.  She remained on MiraLAX while taking Linzess and was unable to wean off of MiraLAX..  She also notes a sore mouth and sharp abdominal pain when taken Dulcolax in the past.  She does not supplement her fiber, but  "notes she tries to eat fiber filled diet.  1-2 times a year if she feels constipated is worse, she will take magnesium citrate which will help clear her out.  Patient reports some blood due to history of hemorrhoids for which she has seen colorectal surgery.    The patient also notes an episode occurring the first week of December in which during intercourse she experiences stabbing pain in her left lower quadrant below her rib cage.  This was excruciatingly painful per patient, and she tried to walk and breathe to help her symptoms.  She became lightheaded and nauseated and \"passed out \"for 15 seconds.  After this, she had a urgent bowel movement when she went to the bathroom with diarrhea.  She was seen through OB/GYN for this with a pelvic ultrasound which was normal.  Similar symptom but a couple weeks ago however the patient stopped intercourse and felt better with time.    In regards to upper GI symptoms, she denies significant heartburn, dysphagia, nausea vomiting, odynophagia.  She continues to take omeprazole on a daily basis.      ROS:    No fevers or chills  No weight loss- cannot loose weight   No blurry vision, double vision or change in vision  No sore throat  No lymphadenopathy  No headache, paraesthesias, or weakness in a limb  + headaches- 2-3 times a week. Tries to take tylenol- can take some ibuprofen   No shortness of breath or wheezing  No chest pain or pressure  No arthralgias or myalgias  No rashes or skin changes  No odynophagia or dysphagia  No BRBPR, hematochezia, melena  No dysuria, frequency or urgency  No hot/cold intolerance or polyria  No anxiety or depression    PROBLEM LIST  Patient Active Problem List    Diagnosis Date Noted     Slow transit constipation 06/22/2016     Priority: Medium       PERTINENT PAST MEDICAL HISTORY:  Asthma, seasonal allergies  No past medical history on file.    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    ALLERGIES:  Allergies   Allergen Reactions     Milk Protein Extract Other (See Comments)     Intense pain within 15 minutes.      Ancef [Cefazolin Sodium]      Penicillins      Shellfish Allergy      Vicodin [Hydrocodone-Acetaminophen]        PERTINENT MEDICATIONS:    Current Outpatient Medications:      ALBUTEROL 90 MCG/ACT IN AERS, PRN, Disp: , Rfl:      Cetirizine HCl (ZYRTEC ALLERGY PO), Take by mouth daily PRN, Disp: , Rfl:      COMPOUND (CMPD RX) - PHARMACY TO MIX COMPOUNDED MEDICATION, Apply a pea sized amount three times a day for 8 weeks., Disp: 30 g, Rfl: 0     dicyclomine (BENTYL) 10 MG capsule, Take 1-2 capsules (10-20 mg) by mouth 2 times daily as needed, Disp: 40 capsule, Rfl: 3     diltiazem 2% in PLO cream, FV COMPOUNDED, 2% GEL, To anal opening three times daily.  Use a pea-sized amount.  Store at room temperature., Disp: 60 g, Rfl: 2     Fexofenadine HCl (ALLEGRA PO), Take by mouth daily, Disp: , Rfl:      FLUTICASONE PROPIONATE, NASAL, NA, , Disp: , Rfl:      hyoscyamine (ANASPAZ,LEVSIN) 0.125 MG tablet, Take 1-2 tablets (125-250 mcg) by mouth every 4 hours as needed for cramping, Disp: 40 tablet, Rfl: 1     JUNEL FE 1.5/30 1.5-30 MG-MCG OR TABS, 1 TABLET DAILY, Disp: , Rfl:      linaclotide (LINZESS) 290 MCG capsule, Take 1 capsule (290 mcg) by mouth every morning (before breakfast), Disp: 30 capsule, Rfl: 1     OMEPRAZOLE PO, , Disp: , Rfl:      polyethylene glycol (MIRALAX) powder, Take 2 capfuls by mouth daily , Disp: , Rfl:     SOCIAL HISTORY:  No drug use  2 drinks a week   Social History     Socioeconomic History     Marital status: Single     Spouse name: Not on file     Number of children: Not on file     Years of education: Not on file     Highest education level: Not on file   Social Needs     Financial resource strain: Not on file     Food insecurity - worry: Not on file     Food insecurity - inability: Not on file     Transportation needs - medical: Not on file      "Transportation needs - non-medical: Not on file   Occupational History     Not on file   Tobacco Use     Smoking status: Never Smoker   Substance and Sexual Activity     Alcohol use: Yes     Drug use: Not on file     Sexual activity: Not on file   Other Topics Concern     Parent/sibling w/ CABG, MI or angioplasty before 65F 55M? Not Asked   Social History Narrative     Not on file       FAMILY HISTORY:  FH of CRC: none   FH of IBD: none  Family History   Problem Relation Age of Onset     Cancer - colorectal No family hx of      Other - See Comments No family hx of         autoimmune disease       Past/family/social history reviewed and no changes    PHYSICAL EXAMINATION:  Constitutional: aaox3, cooperative, pleasant, not dyspneic/diaphoretic, no acute distress  Vitals reviewed: /70   Pulse 68   Ht 1.676 m (5' 6\")   Wt 68 kg (149 lb 14.4 oz)   SpO2 100%   BMI 24.19 kg/m    Wt:   Wt Readings from Last 2 Encounters:   01/28/19 68 kg (149 lb 14.4 oz)   04/18/17 63.2 kg (139 lb 6.4 oz)      Eyes: Sclera anicteric/injected  Ears/nose/mouth/throat: Normal oropharynx without ulcers or exudate, mucus membranes moist, hearing intact  Neck: supple, thyroid normal size  CV: No edema  Respiratory: Unlabored breathing  Lymph: No submandibular, supraclavicular or lymphadenopathy  Abd: Nondistended,  no hepatosplenomegaly, nontender, no peritoneal signs  Skin: warm, perfused, no jaundice  Psych: Normal affect  MSK: Normal gait      PERTINENT STUDIES:    Office Visit on 09/30/2014   Component Date Value Ref Range Status     Ferritin 09/30/2014 15  10 - 120 ng/mL Final     Iron 09/30/2014 47  35 - 180 ug/dL Final     Iron Binding Cap 09/30/2014 433* 240 - 430 ug/dL Final     Iron Saturation Index 09/30/2014 11* 15 - 46 % Final     CRP Inflammation 09/30/2014 <2.9  0.0 - 8.0 mg/L Final         Answers for HPI/ROS submitted by the patient on 1/28/2019   General Symptoms: No  Skin Symptoms: No  HENT Symptoms: No  EYE " SYMPTOMS: No  HEART SYMPTOMS: No  LUNG SYMPTOMS: No  INTESTINAL SYMPTOMS: Yes  URINARY SYMPTOMS: No  GYNECOLOGIC SYMPTOMS: No  BREAST SYMPTOMS: No  SKELETAL SYMPTOMS: No  BLOOD SYMPTOMS: No  NERVOUS SYSTEM SYMPTOMS: No  MENTAL HEALTH SYMPTOMS: No  Heart burn or indigestion: No  Nausea: Yes  Vomiting: No  Abdominal pain: Yes  Bloating: Yes  Constipation: Yes  Diarrhea: Yes  Blood in stool: Yes  Black stools: Yes  Rectal or Anal pain: Yes  Fecal incontinence: No  Yellowing of skin or eyes: No  Vomit with blood: No  Change in stools: Yes

## 2019-01-28 NOTE — LETTER
1/28/2019       RE: Linda Madrid  [address]  [facility] [address]     Dear Colleague,    Thank you for referring your patient, Linda Madrid, to the [facility] GASTROENTEROLOGY AND IBD CLINIC at [facility]. Please see a copy of my visit note below.    GI CLINIC VISIT    CC/REFERRING MD:  Linh Ferrara  REASON FOR CONSULTATION: constipation     ASSESSMENT/PLAN:  Linda Madrid is a 33 year old woman with a past medical history of long-standing constipation and diagnosis of irritable bowel syndrome through [place] [facility] in the [facility], anal fissure presenting today for follow-up.     1. Constipation, bloating, abdominal discomfort   Patient with long-standing constipation and GI symptoms likely caused by irritable bowel syndrome constipation.  Will ensure that patient has normal thyroid functioning today with TSH.  We discussed avenues for treating constipation including continuing to manage with MiraLAX 2-3 capfuls a day.  Patient is wondering about other options due to concern of cost and difficulty traveling with MiraLAX.  We discussed another option would be magnesium oxide which would be pill form and the patient is interested in trying at this point.  We will start the patient with magnesium oxide 400 mg a day while continuing MiraLAX, with plan to discontinue MiraLAX pending response.  She was instructed to contact our clinic with updates regarding magnesium oxide so that we can help titrate the medication.  Patient has been on Linzess in the past which she states worsens abdominal pain and did not improve stool form or consistency.  Has also tried Levsin and Bentyl which did not improve symptoms.  We discussed the other treatments for constipation in pill form included amitiza so which she has not been on.  Patient was given information regarding medication and we discussed potential side effects including loose stools.  Will  discuss again at next visit.  Due to history of biofeedback therapy years ago, would be concerned that patient has underlying pelvic floor dysfunction.  I offered to refer the patient again for additional evaluation/treatment however the patient did not find biofeedback therapy helpful and would not like to pursue this at this time. Patient can continue to follow dietary triggers that she has identified, with consideration of meeting with our dietitian if symptoms persist. Will discuss possible allergens at follow-up appointment.   -- check the TSH with labs   -- continue Miralax 2-3 capfuls a day  -- start magnesium oxide (pill form)- 400 mg a day and then back-off Miralax  -- can take up to 1000 mg a day of magnesium, call us with updates     2.  Rectal bleeding  Patient notes continued bright red blood per rectum associated with bowel movements.  Currently being treated by Linh from colorectal surgery.      3. Prior istory of Grade B esophagitis   Patient continues on omeprazole over the counter. Discussed that she could try to taper medication and monitor symptoms, otherwise will discuss this at follow-up appointment. Will contact our clinic if her symptoms worsen.      RTC 3 months    Thank you for this consultation. Patient discussed in brief with Dr. Chairez.   It was a pleasure to participate in the care of this patient; please contact us with any further questions.     Hailey Hunter PA-C  Division of Gastroenterology, Hepatology & Nutrition  [facility]      HPI  Linda Madrid is a 33 year old woman with a past medical history of long-standing constipation and diagnosis of irritable bowel syndrome through [place] [facility] in the [facility], anal fissure presenting today for follow-up. She had previously been seen by Dr. Love and Prerna Bella NP and this is my first encounter with the patient.  Today the patient notes her entire life she has struggled with constipation and  stated it would be common for her to go 1-2 weeks without a bowel movement. She has had workup in the past including pelvic floor evaluation and biofeedback therapy which she did not find helpful. She has also been seen by multiple providers including the [facility].  At her most recent visit in 2016, the patient was recommended to continue miralax and try linactolide in addition to miralax. Patient has also recently been seen by Linh Gómez for anal fissures which she has been treating with nifedipine with consideration of botox if symptoms worsen.     Previous workup has invluded to following:   TTG IgA normal 2008  EGD: normal esophagus, normal stomach, normal duodenum  Flex sig:  normal in 2008  Abdominal US 2016: mildl contracted gallbladder with minimal sludge  2016 EGD for abdominal pain- GRADE B esophagitis, normal stomach and duodneum- (told to continue dexilant)   HIDA scan: normal     Today the patient notes constant bloating and distention which she can sometimes improve with a bowel movement.  If she has a large bowel movement, sometimes she will have worsening cramping after bowel movement.  It can also improve with laying down.  She has tried dietary modifications and notes that dairy makes symptoms worse, and has identified certain food groups including polyols from the low 5 map diet also worsen symptoms.  She tries to eat smaller more frequent meals, avoidance, movements carbonated beverages, but notes that symptoms are worsening not improving.  In regards to her bowel movements she is currently taking 2-3 capfuls of MiraLAX a day and having Cascade scale 6-7 bowel movements 1 time a day.  Patient reports that current dose of MiraLAX allows her to have daily bowel movements which prevents worsening abdominal discomfort due to constipation.  She denies any incontinence but notes some urgency with bowel movements.  She has been on this regimen for many years and has tried Linzess in  "the past which made abdominal pain worse and did not improve frequency or consistency.  She remained on MiraLAX while taking Linzess and was unable to wean off of MiraLAX..  She also notes a sore mouth and sharp abdominal pain when taken Dulcolax in the past.  She does not supplement her fiber, but notes she tries to eat fiber filled diet.  1-2 times a year if she feels constipated is worse, she will take magnesium citrate which will help clear her out.  Patient reports some blood due to history of hemorrhoids for which she has seen colorectal surgery.    The patient also notes an episode occurring the first week of December in which during intercourse she experiences stabbing pain in her left lower quadrant below her rib cage.  This was excruciatingly painful per patient, and she tried to walk and breathe to help her symptoms.  She became lightheaded and nauseated and \"passed out \"for 15 seconds.  After this, she had a urgent bowel movement when she went to the bathroom with diarrhea.  She was seen through OB/GYN for this with a pelvic ultrasound which was normal.  Similar symptom but a couple weeks ago however the patient stopped intercourse and felt better with time.    In regards to upper GI symptoms, she denies significant heartburn, dysphagia, nausea vomiting, odynophagia.  She continues to take omeprazole on a daily basis.      ROS:    No fevers or chills  No weight loss- cannot loose weight   No blurry vision, double vision or change in vision  No sore throat  No lymphadenopathy  No headache, paraesthesias, or weakness in a limb  + headaches- 2-3 times a week. Tries to take tylenol- can take some ibuprofen   No shortness of breath or wheezing  No chest pain or pressure  No arthralgias or myalgias  No rashes or skin changes  No odynophagia or dysphagia  No BRBPR, hematochezia, melena  No dysuria, frequency or urgency  No hot/cold intolerance or polyria  No anxiety or depression    PROBLEM LIST  Patient Active " Problem List    Diagnosis Date Noted     Slow transit constipation 06/22/2016     Priority: Medium       PERTINENT PAST MEDICAL HISTORY:  Asthma, seasonal allergies  No past medical history on file.    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    ALLERGIES:  Allergies   Allergen Reactions     Milk Protein Extract Other (See Comments)     Intense pain within 15 minutes.      Ancef [Cefazolin Sodium]      Penicillins      Shellfish Allergy      Vicodin [Hydrocodone-Acetaminophen]        PERTINENT MEDICATIONS:    Current Outpatient Medications:      ALBUTEROL 90 MCG/ACT IN AERS, PRN, Disp: , Rfl:      Cetirizine HCl (ZYRTEC ALLERGY PO), Take by mouth daily PRN, Disp: , Rfl:      COMPOUND (CMPD RX) - PHARMACY TO MIX COMPOUNDED MEDICATION, Apply a pea sized amount three times a day for 8 weeks., Disp: 30 g, Rfl: 0     dicyclomine (BENTYL) 10 MG capsule, Take 1-2 capsules (10-20 mg) by mouth 2 times daily as needed, Disp: 40 capsule, Rfl: 3     diltiazem 2% in PLO cream, FV COMPOUNDED, 2% GEL, To anal opening three times daily.  Use a pea-sized amount.  Store at room temperature., Disp: 60 g, Rfl: 2     Fexofenadine HCl (ALLEGRA PO), Take by mouth daily, Disp: , Rfl:      FLUTICASONE PROPIONATE, NASAL, NA, , Disp: , Rfl:      hyoscyamine (ANASPAZ,LEVSIN) 0.125 MG tablet, Take 1-2 tablets (125-250 mcg) by mouth every 4 hours as needed for cramping, Disp: 40 tablet, Rfl: 1     JUNEL FE 1.5/30 1.5-30 MG-MCG OR TABS, 1 TABLET DAILY, Disp: , Rfl:      linaclotide (LINZESS) 290 MCG capsule, Take 1 capsule (290 mcg) by mouth every morning (before breakfast), Disp: 30 capsule, Rfl: 1     OMEPRAZOLE PO, , Disp: , Rfl:      polyethylene glycol (MIRALAX) powder, Take 2 capfuls by mouth daily , Disp: , Rfl:     SOCIAL HISTORY:  No drug use  2 drinks a week   Social History     Socioeconomic History     Marital status: Single     Spouse name: Not on file     Number  "of children: Not on file     Years of education: Not on file     Highest education level: Not on file   Social Needs     Financial resource strain: Not on file     Food insecurity - worry: Not on file     Food insecurity - inability: Not on file     Transportation needs - medical: Not on file     Transportation needs - non-medical: Not on file   Occupational History     Not on file   Tobacco Use     Smoking status: Never Smoker   Substance and Sexual Activity     Alcohol use: Yes     Drug use: Not on file     Sexual activity: Not on file   Other Topics Concern     Parent/sibling w/ CABG, MI or angioplasty before 65F 55M? Not Asked   Social History Narrative     Not on file       FAMILY HISTORY:  FH of CRC: none   FH of IBD: none  Family History   Problem Relation Age of Onset     Cancer - colorectal No family hx of      Other - See Comments No family hx of         autoimmune disease       Past/family/social history reviewed and no changes    PHYSICAL EXAMINATION:  Constitutional: aaox3, cooperative, pleasant, not dyspneic/diaphoretic, no acute distress  Vitals reviewed: /70   Pulse 68   Ht 1.676 m (5' 6\")   Wt 68 kg (149 lb 14.4 oz)   SpO2 100%   BMI 24.19 kg/m     Wt:   Wt Readings from Last 2 Encounters:   01/28/19 68 kg (149 lb 14.4 oz)   04/18/17 63.2 kg (139 lb 6.4 oz)      Eyes: Sclera anicteric/injected  Ears/nose/mouth/throat: Normal oropharynx without ulcers or exudate, mucus membranes moist, hearing intact  Neck: supple, thyroid normal size  CV: No edema  Respiratory: Unlabored breathing  Lymph: No submandibular, supraclavicular or lymphadenopathy  Abd: Nondistended,  no hepatosplenomegaly, nontender, no peritoneal signs  Skin: warm, perfused, no jaundice  Psych: Normal affect  MSK: Normal gait      PERTINENT STUDIES:    Office Visit on 09/30/2014   Component Date Value Ref Range Status     Ferritin 09/30/2014 15  10 - 120 ng/mL Final     Iron 09/30/2014 47  35 - 180 ug/dL Final     Iron Binding " Cap 09/30/2014 433* 240 - 430 ug/dL Final     Iron Saturation Index 09/30/2014 11* 15 - 46 % Final     CRP Inflammation 09/30/2014 <2.9  0.0 - 8.0 mg/L Final     Hailey Hunter PA-C

## 2019-01-28 NOTE — PATIENT INSTRUCTIONS
It was a pleasure taking care of you today.  I've included a brief summary of our discussion and care plan from today's visit below.  Please review this information with your primary care provider.  ______________________________________________________________________    My recommendations are summarized as follows:    -- check the TSH with labs     -- continue Miralax 2-3 capfuls a day    -- start magnesium oxide (pill form)- 400 mg a day and then back-off Miralax    -- can take up to 1000 mg a day of magnesium, call us with updates     Return to GI Clinic in 3 months to review your progress.    ______________________________________________________________________    Who do I call with any questions after my visit?  Please be in touch if there are any further questions that arise following today's visit.  There are multiple ways to contact your gastroenterology care team.        During business hours, you may reach a Gastroenterology nurse at [phone]      To schedule or reschedule an appointment, please call [phone].       You can always send a secure message through [employer].  [employer] messages are answered by your nurse or doctor typically within 24 hours.  Please allow extra time on weekends and holidays.        For urgent/emergent questions after business hours, you may reach the on-call GI Fellow by contacting the [facility] at [phone].     How will I get the results of any tests ordered?    You will receive all of your results.  If you have signed up for [employer], any tests ordered at your visit will be available to you after your physician reviews them.  Typically this takes 1-2 weeks.  If there are urgent results that require a change in your care plan, your physician or nurse will call you to discuss the next steps.      What is [employer]?  [employer] is a secure way for you to access all of your healthcare records from the [facility].  It is a web based computer  program, so you can sign on to it from any location.  It also allows you to send secure messages to your care team.  I recommend signing up for [employer] access if you have not already done so and are comfortable with using a computer.      How to I schedule a follow-up visit?  If you did not schedule a follow-up visit today, please call [phone] to schedule a follow-up office visit.        Sincerely,    Hailey Hunter PA-C  Division of Gastroenterology, Hepatology & Nutrition  [facility]

## 2019-01-28 NOTE — NURSING NOTE
"Chief Complaint   Patient presents with     New Patient     New patient consult       Vitals:    01/28/19 1545   BP: 108/70   Pulse: 68   SpO2: 100%   Weight: 68 kg (149 lb 14.4 oz)   Height: 1.676 m (5' 6\")       Body mass index is 24.19 kg/m .    VAHID Nye                          "

## 2019-01-29 ENCOUNTER — PRE VISIT (OUTPATIENT)
Dept: SURGERY | Facility: CLINIC | Age: 34
End: 2019-01-29

## 2019-01-30 RX ORDER — POLYETHYLENE GLYCOL 3350 17 G/17G
1 POWDER, FOR SOLUTION ORAL DAILY
Qty: 510 G | Refills: 1 | Status: CANCELLED | OUTPATIENT
Start: 2019-01-30

## 2019-02-27 ENCOUNTER — OFFICE VISIT (OUTPATIENT)
Dept: SURGERY | Facility: CLINIC | Age: 34
End: 2019-02-27
Payer: COMMERCIAL

## 2019-02-27 VITALS
OXYGEN SATURATION: 98 % | TEMPERATURE: 97.7 F | DIASTOLIC BLOOD PRESSURE: 74 MMHG | SYSTOLIC BLOOD PRESSURE: 112 MMHG | WEIGHT: 145.6 LBS | HEART RATE: 63 BPM | BODY MASS INDEX: 23.4 KG/M2 | HEIGHT: 66 IN

## 2019-02-27 DIAGNOSIS — K60.2 ANAL FISSURE: Primary | ICD-10-CM

## 2019-02-27 DIAGNOSIS — K59.09 OTHER CONSTIPATION: ICD-10-CM

## 2019-02-27 RX ORDER — FAMOTIDINE 20 MG
TABLET ORAL
COMMUNITY

## 2019-02-27 RX ORDER — MELATONIN 3 MG
TABLET ORAL
COMMUNITY

## 2019-02-27 RX ORDER — BIOTIN 10000 MCG
CAPSULE ORAL
COMMUNITY

## 2019-02-27 ASSESSMENT — PAIN SCALES - GENERAL: PAINLEVEL: MILD PAIN (2)

## 2019-02-27 ASSESSMENT — MIFFLIN-ST. JEOR: SCORE: 1382.19

## 2019-02-27 NOTE — LETTER
2019       RE: Linda Madrid  [address]  [place] [address]     Dear Colleague,    Thank you for referring your patient, Linda Madrid, to the [facility] COLON AND RECTAL SURGERY at [facility]. Please see a copy of my visit note below.    Colon and Rectal Surgery Follow Up Clinic Note    Referring provider:  Juanita Rdz MD  [facility]  [address]  [place] [address]     RE: Linda Madrid  : 1985  KEVIN: 2019    Linda Madrid is a very pleasant 33 year old female with recurrent anal fissure who presents today for follow up. I saw her last on 19 with a recurrent fissure.     Linda does not feel like her fissure has healed. In the past she would get relief with topical nifedipine after a few weeks. She has been using nifedipine since November and still has sharp pain with bowel movements. She denies any bleeding.  She additionally continues to have hemorrhoids that prolapse out and she needs to manually reduce these. She is wondering if these can be addressed in surgery at the same time as her fissure.  She continues to have ongoing constipation. She met with NATTY Sams with gastroenterology but has not gotten the prescriptions for magnesium oxide yet.  She denies any family history of any colon cancer. She has had a few colonoscopies in the past with the last in  with biopsies at that time.    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 On exam she has a posterior midline anal fissure.  Discussed that she likely continues to get anal fissures because of her uncontrolled constipation. Discussed the importance of management of constipation and she plans to continue to follow with gastroenterology for this. She has responded well to nifedipine in the past but this current fissure has not healed with topical therapy in the past 3 months. We discussed trying Botox injections.  She would like her prolapsing hemorrhoids addressed at the same time, if possible. She has always seen me with an active fissure and pain so I have never assessed her hemorrhoids. These can be assessed at the time of surgery and if the surgeon feels it would be appropriate can perform banding or hemorrhoidectomy. She would prefer banding if he feels it is appropriate. She can otherwise return to clinic after her fissure has healed to address her hemorrhoids if no intervention is advised at the time of surgery. Discussed the risks of surgery including pain, bleeding, infection, hemorrhoids, stenosis, and fecal incontinence. She states an understanding of these risks and wishes to proceed with surgical intervention.    Answers for HPI/ROS submitted by the patient on 3/2/2016   General Symptoms: No  Skin Symptoms: No  HENT Symptoms: No  EYE SYMPTOMS: No  HEART SYMPTOMS: No  LUNG SYMPTOMS: No  INTESTINAL SYMPTOMS: Yes  URINARY SYMPTOMS: No  GYNECOLOGIC SYMPTOMS: No  BREAST SYMPTOMS: No  SKELETAL SYMPTOMS: No  BLOOD SYMPTOMS: No  NERVOUS SYSTEM SYMPTOMS: No  MENTAL HEALTH SYMPTOMS: No  Heart burn or indigestion: No  Nausea: No  Vomiting: No  Abdominal pain: No  Bloating: Yes  Constipation: No  Diarrhea: Yes  Blood in stool: No  Black stools: No  Rectal/ Anal pain: Yes  Fecal incontinence: No  Rectal bleeding: Yes  Yellowing of skin or eyes: No  Vomit with blood: No  Change in stools: No  Hemorrhoids: Yes      Medical history:  No past medical history on file.    Surgical history:  No past surgical history on file.    Problem list:    Patient Active Problem List    Diagnosis Date Noted     Slow transit constipation 06/22/2016     Priority: Medium       Medications:  Current Outpatient Medications   Medication Sig Dispense Refill     ALBUTEROL 90 MCG/ACT IN AERS PRN       Biotin 10 MG CAPS        Cetirizine HCl (ZYRTEC ALLERGY PO) Take by mouth daily PRN       COMPOUND (CMPD RX) - PHARMACY TO MIX COMPOUNDED MEDICATION  "Apply a pea sized amount three times a day for 8 weeks. 30 g 0     Fexofenadine HCl (ALLEGRA PO) Take by mouth daily       FLUTICASONE PROPIONATE, NASAL, NA        JUNEL FE 1.5/30 1.5-30 MG-MCG OR TABS 1 TABLET DAILY       magnesium oxide 400 MG CAPS Take 400 mg by mouth daily 90 capsule 3     melatonin 3-10 MG TABS        polyethylene glycol (MIRALAX) powder Take 2 capfuls by mouth daily        Vitamin D, Cholecalciferol, 1000 units CAPS        diltiazem 2% in PLO cream, FV COMPOUNDED, 2% GEL To anal opening three times daily.  Use a pea-sized amount.  Store at room temperature. (Patient not taking: Reported on 2/27/2019) 60 g 2     OMEPRAZOLE PO          Allergies:  Allergies   Allergen Reactions     Milk Protein Extract Other (See Comments)     Intense pain within 15 minutes.      Ancef [Cefazolin Sodium]      Penicillins      Shellfish Allergy      Vicodin [Hydrocodone-Acetaminophen]        Family history:  Family History   Problem Relation Age of Onset     Cancer - colorectal No family hx of      Other - See Comments No family hx of         autoimmune disease       Social history:  Social History     Tobacco Use     Smoking status: Never Smoker     Smokeless tobacco: Never Used   Substance Use Topics     Alcohol use: Yes    Marital status: single.  Occupation: Speech pathologist.    Nursing Notes:   Adria Powers, EMT  2/27/2019  4:32 PM  Signed  Chief Complaint   Patient presents with     Consult     Rectal bleeding, hx of anal fissure and hemmroids       Vitals:    02/27/19 1626   BP: 112/74   Pulse: 63   Temp: 97.7  F (36.5  C)   TempSrc: Oral   SpO2: 98%   Weight: 66 kg (145 lb 9.6 oz)   Height: 1.676 m (5' 6\")       Body mass index is 23.5 kg/m .      Adria Powers, EMT on 2/27/2019 at 4:31 PM    Physical Examination:  /74   Pulse 63   Temp 97.7  F (36.5  C) (Oral)   Ht 5' 6\"   Wt 145 lb 9.6 oz   SpO2 98%   BMI 23.50 kg/m     General: Alert, oriented, in no acute distress but appears " uncomfortable  HEENT: Mucous membranes moist  Perianal external examination:  Perianal skin: Intact with no excoriation or lichenification.  Lesions: No evidence of an external lesion, nodularity, or induration in the perianal region.  Eversion of buttocks: There was evidence of an anal fissure. Details: Posterior midline anal fissure  Skin tags or external hemorrhoids: none  Digital rectal examination: Was deferred in order not to cause further trauma.    Anoscopy: Was deferred in order not to cause further trauma.    Total face to face time was 15 minutes, >50% counseling.    Again, thank you for allowing me to participate in the care of your patient.      Sincerely,    HELEN Mcdermott CNP

## 2019-02-27 NOTE — PROGRESS NOTES
Colon and Rectal Surgery Follow Up Clinic Note    Referring provider:  Juanita Rdz MD  [id number] [facility] [address]  [place] [address]     RE: Linda Madrid  : 1985  KEVIN: 2019    Linda Madrid is a very pleasant 33 year old female with recurrent anal fissure who presents today for follow up. I saw her last on 19 with a recurrent fissure.     Linda does not feel like her fissure has healed. In the past she would get relief with topical nifedipine after a few weeks. She has been using nifedipine since November and still has sharp pain with bowel movements. She denies any bleeding.  She additionally continues to have hemorrhoids that prolapse out and she needs to manually reduce these. She is wondering if these can be addressed in surgery at the same time as her fissure.  She continues to have ongoing constipation. She met with NATTY Sams with gastroenterology but has not gotten the prescriptions for magnesium oxide yet.  She denies any family history of any colon cancer. She has had a few colonoscopies in the past with the last in  with biopsies at that time.    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 On exam she has a posterior midline anal fissure.  Discussed that she likely continues to get anal fissures because of her uncontrolled constipation. Discussed the importance of management of constipation and she plans to continue to follow with gastroenterology for this. She has responded well to nifedipine in the past but this current fissure has not healed with topical therapy in the past 3 months. We discussed trying Botox injections. She would like her prolapsing hemorrhoids addressed at the same time, if possible. She has always seen me with an active fissure and pain so I have never assessed her hemorrhoids. These can be assessed at the time of surgery and if the surgeon feels it would be appropriate can perform banding or  hemorrhoidectomy. She would prefer banding if he feels it is appropriate. She can otherwise return to clinic after her fissure has healed to address her hemorrhoids if no intervention is advised at the time of surgery. Discussed the risks of surgery including pain, bleeding, infection, hemorrhoids, stenosis, and fecal incontinence. She states an understanding of these risks and wishes to proceed with surgical intervention.    Answers for HPI/ROS submitted by the patient on 3/2/2016   General Symptoms: No  Skin Symptoms: No  HENT Symptoms: No  EYE SYMPTOMS: No  HEART SYMPTOMS: No  LUNG SYMPTOMS: No  INTESTINAL SYMPTOMS: Yes  URINARY SYMPTOMS: No  GYNECOLOGIC SYMPTOMS: No  BREAST SYMPTOMS: No  SKELETAL SYMPTOMS: No  BLOOD SYMPTOMS: No  NERVOUS SYSTEM SYMPTOMS: No  MENTAL HEALTH SYMPTOMS: No  Heart burn or indigestion: No  Nausea: No  Vomiting: No  Abdominal pain: No  Bloating: Yes  Constipation: No  Diarrhea: Yes  Blood in stool: No  Black stools: No  Rectal/ Anal pain: Yes  Fecal incontinence: No  Rectal bleeding: Yes  Yellowing of skin or eyes: No  Vomit with blood: No  Change in stools: No  Hemorrhoids: Yes      Medical history:  No past medical history on file.    Surgical history:  No past surgical history on file.    Problem list:    Patient Active Problem List    Diagnosis Date Noted     Slow transit constipation 06/22/2016     Priority: Medium       Medications:  Current Outpatient Medications   Medication Sig Dispense Refill     ALBUTEROL 90 MCG/ACT IN AERS PRN       Biotin 10 MG CAPS        Cetirizine HCl (ZYRTEC ALLERGY PO) Take by mouth daily PRN       COMPOUND (CMPD RX) - PHARMACY TO MIX COMPOUNDED MEDICATION Apply a pea sized amount three times a day for 8 weeks. 30 g 0     Fexofenadine HCl (ALLEGRA PO) Take by mouth daily       FLUTICASONE PROPIONATE, NASAL, NA        JUNEL FE 1.5/30 1.5-30 MG-MCG OR TABS 1 TABLET DAILY       magnesium oxide 400 MG CAPS Take 400 mg by mouth daily 90 capsule 3      "melatonin 3-10 MG TABS        polyethylene glycol (MIRALAX) powder Take 2 capfuls by mouth daily        Vitamin D, Cholecalciferol, 1000 units CAPS        diltiazem 2% in PLO cream, FV COMPOUNDED, 2% GEL To anal opening three times daily.  Use a pea-sized amount.  Store at room temperature. (Patient not taking: Reported on 2/27/2019) 60 g 2     OMEPRAZOLE PO          Allergies:  Allergies   Allergen Reactions     Milk Protein Extract Other (See Comments)     Intense pain within 15 minutes.      Ancef [Cefazolin Sodium]      Penicillins      Shellfish Allergy      Vicodin [Hydrocodone-Acetaminophen]        Family history:  Family History   Problem Relation Age of Onset     Cancer - colorectal No family hx of      Other - See Comments No family hx of         autoimmune disease       Social history:  Social History     Tobacco Use     Smoking status: Never Smoker     Smokeless tobacco: Never Used   Substance Use Topics     Alcohol use: Yes    Marital status: single.  Occupation: Speech pathologist.    Nursing Notes:   Adria Powers, EMT  2/27/2019  4:32 PM  Signed  Chief Complaint   Patient presents with     Consult     Rectal bleeding, hx of anal fissure and hemmroids       Vitals:    02/27/19 1626   BP: 112/74   Pulse: 63   Temp: 97.7  F (36.5  C)   TempSrc: Oral   SpO2: 98%   Weight: 66 kg (145 lb 9.6 oz)   Height: 1.676 m (5' 6\")       Body mass index is 23.5 kg/m .      Adria Powers, EMT on 2/27/2019 at 4:31 PM                          Physical Examination:  /74   Pulse 63   Temp 97.7  F (36.5  C) (Oral)   Ht 5' 6\"   Wt 145 lb 9.6 oz   SpO2 98%   BMI 23.50 kg/m    General: Alert, oriented, in no acute distress but appears uncomfortable  HEENT: Mucous membranes moist  Perianal external examination:  Perianal skin: Intact with no excoriation or lichenification.  Lesions: No evidence of an external lesion, nodularity, or induration in the perianal region.  Eversion of buttocks: There was evidence of " an anal fissure. Details: Posterior midline anal fissure  Skin tags or external hemorrhoids: none  Digital rectal examination: Was deferred in order not to cause further trauma.    Anoscopy: Was deferred in order not to cause further trauma.    Total face to face time was 15 minutes, >50% counseling.    HELEN Freitas, NP-C  Colon and Rectal Surgery   [facility]

## 2019-02-28 ENCOUNTER — TELEPHONE (OUTPATIENT)
Dept: GASTROENTEROLOGY | Facility: CLINIC | Age: 34
End: 2019-02-28

## 2019-02-28 DIAGNOSIS — K64.9 BLEEDING HEMORRHOID: Primary | ICD-10-CM

## 2019-02-28 NOTE — TELEPHONE ENCOUNTER
Patient called and voicemail left instructing her to call our clinic or send [employer] message regarding a time she is available to talk regarding follow-up from her clinic appointment.     Hailey Hunter PA-C  Division of Gastroenterology, Hepatology & Nutrition  [facility]

## 2019-03-01 ENCOUNTER — TELEPHONE (OUTPATIENT)
Dept: SURGERY | Facility: CLINIC | Age: 34
End: 2019-03-01

## 2019-03-04 NOTE — TELEPHONE ENCOUNTER
Patient is scheduled for surgery with Dr. Blackwell      Spoke or left message with: Linda    Date of Surgery: 4/5/2019    Location: [facility]    H&P: Scheduled with PCP appt. On 3/28    Additional imaging/appointments: Post op scheduled with Linh Grande    Surgery packet: Will be mailed     Additional comments: NA

## 2019-04-04 ENCOUNTER — ANESTHESIA EVENT (OUTPATIENT)
Dept: SURGERY | Facility: AMBULATORY SURGERY CENTER | Age: 34
End: 2019-04-04

## 2019-04-05 ENCOUNTER — ANESTHESIA (OUTPATIENT)
Dept: SURGERY | Facility: AMBULATORY SURGERY CENTER | Age: 34
End: 2019-04-05

## 2019-04-05 ENCOUNTER — TELEPHONE (OUTPATIENT)
Dept: GASTROENTEROLOGY | Facility: OUTPATIENT CENTER | Age: 34
End: 2019-04-05

## 2019-04-05 ENCOUNTER — HOSPITAL ENCOUNTER (OUTPATIENT)
Facility: AMBULATORY SURGERY CENTER | Age: 34
End: 2019-04-05
Attending: COLON & RECTAL SURGERY
Payer: COMMERCIAL

## 2019-04-05 VITALS
WEIGHT: 140 LBS | DIASTOLIC BLOOD PRESSURE: 58 MMHG | RESPIRATION RATE: 16 BRPM | HEIGHT: 66 IN | OXYGEN SATURATION: 99 % | BODY MASS INDEX: 22.5 KG/M2 | TEMPERATURE: 98.8 F | HEART RATE: 80 BPM | SYSTOLIC BLOOD PRESSURE: 106 MMHG

## 2019-04-05 DIAGNOSIS — K60.2 ANAL FISSURE: ICD-10-CM

## 2019-04-05 LAB
HCG UR QL: NEGATIVE
INTERNAL QC OK POCT: YES

## 2019-04-05 RX ORDER — IBUPROFEN 800 MG/1
800 TABLET, FILM COATED ORAL 3 TIMES DAILY
Qty: 42 TABLET | Refills: 0 | Status: SHIPPED | OUTPATIENT
Start: 2019-04-05 | End: 2019-04-19

## 2019-04-05 RX ORDER — CIPROFLOXACIN 2 MG/ML
400 INJECTION, SOLUTION INTRAVENOUS SEE ADMIN INSTRUCTIONS
Status: DISCONTINUED | OUTPATIENT
Start: 2019-04-05 | End: 2019-04-05 | Stop reason: HOSPADM

## 2019-04-05 RX ORDER — SODIUM CHLORIDE, SODIUM LACTATE, POTASSIUM CHLORIDE, CALCIUM CHLORIDE 600; 310; 30; 20 MG/100ML; MG/100ML; MG/100ML; MG/100ML
INJECTION, SOLUTION INTRAVENOUS CONTINUOUS
Status: DISCONTINUED | OUTPATIENT
Start: 2019-04-05 | End: 2019-04-05 | Stop reason: HOSPADM

## 2019-04-05 RX ORDER — LEVALBUTEROL TARTRATE 45 UG/1
2 AEROSOL, METERED ORAL EVERY 4 HOURS PRN
COMMUNITY

## 2019-04-05 RX ORDER — ONDANSETRON 4 MG/1
4 TABLET, ORALLY DISINTEGRATING ORAL
Status: DISCONTINUED | OUTPATIENT
Start: 2019-04-05 | End: 2019-04-06 | Stop reason: HOSPADM

## 2019-04-05 RX ORDER — ONDANSETRON 2 MG/ML
4 INJECTION INTRAMUSCULAR; INTRAVENOUS EVERY 30 MIN PRN
Status: DISCONTINUED | OUTPATIENT
Start: 2019-04-05 | End: 2019-04-06 | Stop reason: HOSPADM

## 2019-04-05 RX ORDER — KETOROLAC TROMETHAMINE 30 MG/ML
30 INJECTION, SOLUTION INTRAMUSCULAR; INTRAVENOUS EVERY 6 HOURS PRN
Status: DISCONTINUED | OUTPATIENT
Start: 2019-04-05 | End: 2019-04-06 | Stop reason: HOSPADM

## 2019-04-05 RX ORDER — ONDANSETRON 4 MG/1
4 TABLET, ORALLY DISINTEGRATING ORAL EVERY 30 MIN PRN
Status: DISCONTINUED | OUTPATIENT
Start: 2019-04-05 | End: 2019-04-06 | Stop reason: HOSPADM

## 2019-04-05 RX ORDER — SODIUM CHLORIDE, SODIUM LACTATE, POTASSIUM CHLORIDE, CALCIUM CHLORIDE 600; 310; 30; 20 MG/100ML; MG/100ML; MG/100ML; MG/100ML
INJECTION, SOLUTION INTRAVENOUS CONTINUOUS
Status: DISCONTINUED | OUTPATIENT
Start: 2019-04-05 | End: 2019-04-06 | Stop reason: HOSPADM

## 2019-04-05 RX ORDER — ACETAMINOPHEN 325 MG/1
650 TABLET ORAL 4 TIMES DAILY
Qty: 100 TABLET | Refills: 0 | Status: SHIPPED | OUTPATIENT
Start: 2019-04-05 | End: 2019-04-19

## 2019-04-05 RX ORDER — ONDANSETRON 2 MG/ML
INJECTION INTRAMUSCULAR; INTRAVENOUS PRN
Status: DISCONTINUED | OUTPATIENT
Start: 2019-04-05 | End: 2019-04-05

## 2019-04-05 RX ORDER — METRONIDAZOLE 250 MG/1
250 TABLET ORAL 3 TIMES DAILY
Qty: 15 TABLET | Refills: 0 | Status: SHIPPED | OUTPATIENT
Start: 2019-04-05 | End: 2019-04-17

## 2019-04-05 RX ORDER — OXYCODONE HYDROCHLORIDE 5 MG/1
5-10 TABLET ORAL EVERY 6 HOURS PRN
Status: DISCONTINUED | OUTPATIENT
Start: 2019-04-05 | End: 2019-04-06 | Stop reason: HOSPADM

## 2019-04-05 RX ORDER — NALOXONE HYDROCHLORIDE 0.4 MG/ML
.1-.4 INJECTION, SOLUTION INTRAMUSCULAR; INTRAVENOUS; SUBCUTANEOUS
Status: DISCONTINUED | OUTPATIENT
Start: 2019-04-05 | End: 2019-04-06 | Stop reason: HOSPADM

## 2019-04-05 RX ORDER — LIDOCAINE 40 MG/G
CREAM TOPICAL
Status: DISCONTINUED | OUTPATIENT
Start: 2019-04-05 | End: 2019-04-05 | Stop reason: HOSPADM

## 2019-04-05 RX ORDER — FENTANYL CITRATE 50 UG/ML
INJECTION, SOLUTION INTRAMUSCULAR; INTRAVENOUS PRN
Status: DISCONTINUED | OUTPATIENT
Start: 2019-04-05 | End: 2019-04-05

## 2019-04-05 RX ORDER — KETOROLAC TROMETHAMINE 30 MG/ML
INJECTION, SOLUTION INTRAMUSCULAR; INTRAVENOUS PRN
Status: DISCONTINUED | OUTPATIENT
Start: 2019-04-05 | End: 2019-04-05

## 2019-04-05 RX ORDER — ACETAMINOPHEN 325 MG/1
975 TABLET ORAL ONCE
Status: COMPLETED | OUTPATIENT
Start: 2019-04-05 | End: 2019-04-05

## 2019-04-05 RX ORDER — CIPROFLOXACIN 2 MG/ML
400 INJECTION, SOLUTION INTRAVENOUS
Status: COMPLETED | OUTPATIENT
Start: 2019-04-05 | End: 2019-04-05

## 2019-04-05 RX ORDER — MEPERIDINE HYDROCHLORIDE 25 MG/ML
12.5 INJECTION INTRAMUSCULAR; INTRAVENOUS; SUBCUTANEOUS
Status: DISCONTINUED | OUTPATIENT
Start: 2019-04-05 | End: 2019-04-06 | Stop reason: HOSPADM

## 2019-04-05 RX ORDER — LIDOCAINE HYDROCHLORIDE 20 MG/ML
INJECTION, SOLUTION INFILTRATION; PERINEURAL PRN
Status: DISCONTINUED | OUTPATIENT
Start: 2019-04-05 | End: 2019-04-05

## 2019-04-05 RX ORDER — PROPOFOL 10 MG/ML
INJECTION, EMULSION INTRAVENOUS PRN
Status: DISCONTINUED | OUTPATIENT
Start: 2019-04-05 | End: 2019-04-05

## 2019-04-05 RX ORDER — POLYETHYLENE GLYCOL 3350 17 G/17G
1 POWDER, FOR SOLUTION ORAL 2 TIMES DAILY
Qty: 500 G | Refills: 0 | Status: SHIPPED | OUTPATIENT
Start: 2019-04-05 | End: 2019-05-15

## 2019-04-05 RX ORDER — FENTANYL CITRATE 50 UG/ML
25-50 INJECTION, SOLUTION INTRAMUSCULAR; INTRAVENOUS
Status: DISCONTINUED | OUTPATIENT
Start: 2019-04-05 | End: 2019-04-06 | Stop reason: HOSPADM

## 2019-04-05 RX ORDER — PROPOFOL 10 MG/ML
INJECTION, EMULSION INTRAVENOUS CONTINUOUS PRN
Status: DISCONTINUED | OUTPATIENT
Start: 2019-04-05 | End: 2019-04-05

## 2019-04-05 RX ORDER — ACETAMINOPHEN 325 MG/1
975 TABLET ORAL ONCE
Status: DISCONTINUED | OUTPATIENT
Start: 2019-04-05 | End: 2019-04-05 | Stop reason: HOSPADM

## 2019-04-05 RX ORDER — BUPIVACAINE HYDROCHLORIDE 2.5 MG/ML
INJECTION, SOLUTION EPIDURAL; INFILTRATION; INTRACAUDAL PRN
Status: DISCONTINUED | OUTPATIENT
Start: 2019-04-05 | End: 2019-04-05 | Stop reason: HOSPADM

## 2019-04-05 RX ORDER — SILVER SULFADIAZINE 10 MG/G
CREAM TOPICAL PRN
Status: DISCONTINUED | OUTPATIENT
Start: 2019-04-05 | End: 2019-04-05 | Stop reason: HOSPADM

## 2019-04-05 RX ORDER — OXYCODONE HYDROCHLORIDE 5 MG/1
5-10 TABLET ORAL EVERY 6 HOURS PRN
Qty: 30 TABLET | Refills: 0 | Status: SHIPPED | OUTPATIENT
Start: 2019-04-05 | End: 2019-05-15

## 2019-04-05 RX ORDER — DEXAMETHASONE SODIUM PHOSPHATE 4 MG/ML
INJECTION, SOLUTION INTRA-ARTICULAR; INTRALESIONAL; INTRAMUSCULAR; INTRAVENOUS; SOFT TISSUE PRN
Status: DISCONTINUED | OUTPATIENT
Start: 2019-04-05 | End: 2019-04-05

## 2019-04-05 RX ADMIN — LIDOCAINE HYDROCHLORIDE 60 MG: 20 INJECTION, SOLUTION INFILTRATION; PERINEURAL at 10:08

## 2019-04-05 RX ADMIN — DEXAMETHASONE SODIUM PHOSPHATE 4 MG: 4 INJECTION, SOLUTION INTRA-ARTICULAR; INTRALESIONAL; INTRAMUSCULAR; INTRAVENOUS; SOFT TISSUE at 10:21

## 2019-04-05 RX ADMIN — OXYCODONE HYDROCHLORIDE 5 MG: 5 TABLET ORAL at 10:58

## 2019-04-05 RX ADMIN — SODIUM CHLORIDE, SODIUM LACTATE, POTASSIUM CHLORIDE, CALCIUM CHLORIDE: 600; 310; 30; 20 INJECTION, SOLUTION INTRAVENOUS at 09:01

## 2019-04-05 RX ADMIN — CIPROFLOXACIN 400 MG: 2 INJECTION, SOLUTION INTRAVENOUS at 10:00

## 2019-04-05 RX ADMIN — ACETAMINOPHEN 975 MG: 325 TABLET ORAL at 08:48

## 2019-04-05 RX ADMIN — FENTANYL CITRATE 50 MCG: 50 INJECTION, SOLUTION INTRAMUSCULAR; INTRAVENOUS at 10:08

## 2019-04-05 RX ADMIN — PROPOFOL 150 MCG/KG/MIN: 10 INJECTION, EMULSION INTRAVENOUS at 10:08

## 2019-04-05 RX ADMIN — PROPOFOL 150 MG: 10 INJECTION, EMULSION INTRAVENOUS at 10:08

## 2019-04-05 RX ADMIN — Medication 40 MG: at 10:08

## 2019-04-05 RX ADMIN — KETOROLAC TROMETHAMINE 30 MG: 30 INJECTION, SOLUTION INTRAMUSCULAR; INTRAVENOUS at 10:34

## 2019-04-05 RX ADMIN — ONDANSETRON 4 MG: 2 INJECTION INTRAMUSCULAR; INTRAVENOUS at 10:33

## 2019-04-05 RX ADMIN — FENTANYL CITRATE 50 MCG: 50 INJECTION, SOLUTION INTRAMUSCULAR; INTRAVENOUS at 10:03

## 2019-04-05 ASSESSMENT — MIFFLIN-ST. JEOR: SCORE: 1356.79

## 2019-04-05 NOTE — ANESTHESIA PREPROCEDURE EVALUATION
Anesthesia Pre-Procedure Evaluation    Patient: Linda Madrid   MRN:     [id number] Gender:   female   Age:    33 year old :      1985        Preoperative Diagnosis: Hemorrhoids   Procedure(s):  Examination Under Anesthesia  Botox Injection  Possible Hemorrhoid Banding or Hemorrhoidectomy     No past medical history on file.   No past surgical history on file.       Anesthesia Evaluation     . Pt has had prior anesthetic.            ROS/MED HX    ENT/Pulmonary:     (+)asthma , . .    Neurologic:  - neg neurologic ROS     Cardiovascular:  - neg cardiovascular ROS       METS/Exercise Tolerance:     Hematologic:  - neg hematologic  ROS       Musculoskeletal:         GI/Hepatic:     (+) Other GI/Hepatic       Renal/Genitourinary:         Endo:         Psychiatric:         Infectious Disease:         Malignancy:         Other:                         PHYSICAL EXAM:   Mental Status/Neuro: A/A/O   Airway:   Mallampati: II   Respiratory: Auscultation: CTAB      CV: Rhythm: Regular   Comments:      Dental: Normal                  Lab Results   Component Value Date    WBC 6.8 2014    HGB 13.0 2014    HCT 39.7 2014     2014    CRP <2.9 2014    SED 8 2014     2014    POTASSIUM 3.7 2014    CHLORIDE 104 2014    CO2 27 2014    BUN 9 2014    CR 0.66 2014    GLC 84 2014    BRIGITTE 9.3 2014    PHOS 2.8 2014    MAG 2.2 2014    ALBUMIN 4.0 2014    PROTTOTAL 7.0 2014    ALT 14 2014    AST 20 2014    ALKPHOS 52 2014    BILITOTAL 0.4 2014    LIPASE 63 2012    AMYLASE 92 2008    TSH 1.89 2019    T4 0.98 2014    HCG Negative 2008    HCGS Negative 2010       Preop Vitals  BP Readings from Last 3 Encounters:   19 112/74   19 108/70   17 116/75    Pulse Readings from Last 3 Encounters:   19 63   19 68   17 82      Resp  "Readings from Last 3 Encounters:   08/30/15 16   11/27/12 14    SpO2 Readings from Last 3 Encounters:   02/27/19 98%   01/28/19 100%   04/18/17 98%      Temp Readings from Last 1 Encounters:   02/27/19 36.5  C (97.7  F) (Oral)    Ht Readings from Last 1 Encounters:   02/27/19 1.676 m (5' 6\")      Wt Readings from Last 1 Encounters:   02/27/19 66 kg (145 lb 9.6 oz)    Estimated body mass index is 23.5 kg/m  as calculated from the following:    Height as of 2/27/19: 1.676 m (5' 6\").    Weight as of 2/27/19: 66 kg (145 lb 9.6 oz).     LDA:  Peripheral IV 11/27/12 Right (Active)   Number of days: 2320            Assessment:   ASA SCORE: 2    NPO Status: > 6 hours since completed [employer]   Documentation: H&P complete; Preop Testing complete; Consents complete   Proceeding: Proceed without further delay  Tobacco Use:  NO Active use of Tobacco/UNKNOWN Tobacco use status     Plan:   Anes. Type:  General   Pre-Induction: Midazolam IV; Acetaminophen PO; Gabapentin PO   Induction:  IV (Standard)   Airway: Oral ETT   Access/Monitoring: PIV   Maintenance: IV   Emergence: Procedure Site   Logistics: Same Day Surgery     Postop Pain/Sedation Strategy:  Standard-Options: Opioids PRN     PONV Management:  Adult Risk Factors: Female, Non-Smoker, Postop Opioids  Prevention: Ondansetron; Dexamethasone; Propofol Infusion     CONSENT: Direct conversation   Plan and risks discussed with: Patient   Blood Products: Consent Deferred (Minimal Blood Loss)                         Robert Al MD  "

## 2019-04-05 NOTE — ANESTHESIA CARE TRANSFER NOTE
Patient: Linda Madrid    Procedure(s):  Examination Under Anesthesia  Botox Injection  Hemorrhoidectomy x2  ligation of internal hemorrhoid x 1    Diagnosis: Hemorrhoids  Diagnosis Additional Information: No value filed.    Anesthesia Type:   No value filed.     Note:  Airway :Face Mask  Patient transferred to:PACU  Comments: 102/71  100%  98.3-73-20  Handoff Report: Identifed the Patient, Identified the Reponsible Provider, Reviewed the pertinent medical history, Discussed the surgical course, Reviewed Intra-OP anesthesia mangement and issues during anesthesia, Set expectations for post-procedure period and Allowed opportunity for questions and acknowledgement of understanding      Vitals: (Last set prior to Anesthesia Care Transfer)    CRNA VITALS  4/5/2019 1014 - 4/5/2019 1050      4/5/2019             Pulse:  87    SpO2:  100 %    Resp Rate (observed):  1  (Abnormal)     Resp Rate (set):  10                Electronically Signed By: HELEN Acevedo CRNA  April 5, 2019  10:50 AM

## 2019-04-05 NOTE — OP NOTE
Procedure Date: 04/05/2019.      PREOPERATIVE DIAGNOSES:   1.  Chronic anal fissure.   2.  Symptomatic mixed hemorrhoid disease.   3.  Chronic constipation.      POSTOPERATIVE DIAGNOSES:   1.  Chronic anal fissure.   2.  Symptomatic mixed hemorrhoid disease.   3.  Chronic constipation.      ANESTHESIA:  General endotracheal anesthesia plus local anesthetic.      PROCEDURES PERFORMED:   1.  Evaluation under anesthesia.   2.  Botox injection.   3.  Hemorrhoidectomy x2.   4.  Ligation of internal hemorrhoid x1.      SURGEON:  Benito Blackwell MD      ASSISTANT:  None.      DESCRIPTION OF PROCEDURE:  After obtaining informed consent, the patient was brought to the operating room and placed in the supine position.  General endotracheal anesthesia was gently induced without difficulty.  The patient was then turned to the prone jackknife position.  The patient received appropriate preoperative antibiotic prophylaxis as well as mechanical DVT prophylaxis.  Bilateral lower extremity pneumatic compression devices were applied and all pressure points were cushioned.  The perianal area was prepped and draped in the standard sterile fashion.  A total of 30 mL of bupivacaine 0.25% without epinephrine was injected as a pudendal block.  Digital rectal exam as well as anoscopy were performed.  These revealed a deep right posterior anal fissure associated with a large mixed hemorrhoid, a large left lateral mixed hemorrhoid and a large posterior midline internal hemorrhoid.  The right posterior hemorrhoid column was excised using monopolar electrocautery.  This included the edge of the fissure.  Care was taken to not injure any of the muscle fibers of the internal or external anal sphincters.  The specimen was sent for permanent pathology.  A total of 60 units of Botox was injected at the fissure site (40 units at the intersphincteric site on either side of the fissure and 20 units at the apex of the fissure).  The left lateral  hemorrhoid column was also excised completely with monopolar electrocautery.  Care was also taken to not injure any of the muscle fibers of the internal or external anal sphincters.  Both defects were reapproximated with running 3-0 Vicryl sutures.  The posterior midline showed a prominent skin tag and a large internal hemorrhoid column.  This was suture ligated with a figure-of-8 3-0 Vicryl suture.  Hemostasis was corroborated.  Instrument, sponge and needle counts were all correct as reported to me.  The distal rectum and anus were irrigated with warm sterile water and subsequently suctioned out.  Bacitracin was applied as well as a sterile dressing.  The patient tolerated the procedure well.      COMPLICATIONS:  None immediately.      ESTIMATED BLOOD LOSS:  5 mL.      REPLACEMENT:  300 mL of crystalloid.      SPECIMENS:  Right posterior hemorrhoid and left lateral hemorrhoid.      DRAINS/TUBES:  None.      FINDINGS:  Right posterior deep anal fissure associated with a large mixed hemorrhoid, large left lateral hemorrhoid column and a large internal hemorrhoid at the posterior midline.      DISPOSITION:  PACU.         SAMANTHA RAY MD             D: 2019   T: 2019   MT: kayleigh      Name:     ANTHONY CARBAJAL   MRN:      [id number]        Account:        [id number]   :      1985           Procedure Date: 2019      Document: [id number]       cc: Linh Bullard MSN, NP-C       Juanita Rdz MD       [facility] Billing

## 2019-04-05 NOTE — TELEPHONE ENCOUNTER
[facility] Call Center    Phone Message    May a detailed message be left on voicemail: yes    Reason for Call: Other: Pt stated that she received medication metroNIDAZOLE (FLAGYL) 250 MG tablet after her procedure.  Pt stated tat she is not sure of when to start the medication and how often it should be taken. Pt would like a call back for some clarification on this. Please follow up when available.     Action Taken: Message routed to:  [facility] ([facility] Colon Rec Surg

## 2019-04-05 NOTE — BRIEF OP NOTE
[facility]    Brief Operative Note    Pre-operative diagnosis: Hemorrhoids  Post-operative diagnosis * No post-op diagnosis entered *  Procedure: Procedure(s):  Examination Under Anesthesia  Botox Injection  Hemorrhoidectomy x2  ligation of internal hemorrhoid x 1  Surgeon: Surgeon(s) and Role:     * Benito Blackwell MD - Primary  Anesthesia: General   Estimated blood loss: 5mL.  Drains: None  Specimens:   ID Type Source Tests Collected by Time Destination   A : right posterior hemorrhoid Tissue Anus SURGICAL PATHOLOGY EXAM Benito Blackwell MD 4/5/2019 10:22 AM    B : left lateral hemorrhoid Tissue Anus SURGICAL PATHOLOGY EXAM Benito Blackwell MD 4/5/2019 10:24 AM      Findings:   Right posterior fissure with associated large mixed hemorrhoid, and additional left lateral mixed hemorrhoid. Posterior midline int hemorrhoid.  Complications: None.  Implants: None.

## 2019-04-05 NOTE — ANESTHESIA POSTPROCEDURE EVALUATION
Anesthesia POST Procedure Evaluation    Patient: Linda Madrid   MRN:     [id number] Gender:   female   Age:    33 year old :      1985        Preoperative Diagnosis: Hemorrhoids   Procedure(s):  Examination Under Anesthesia  Botox Injection  Hemorrhoidectomy x2  ligation of internal hemorrhoid x 1   Postop Comments: No value filed.       Anesthesia Type:  General    Reportable Event: NO     PAIN: Uncomplicated   Sign Out status: Comfortable, Well controlled pain     PONV: No PONV   Sign Out status:  No Nausea or Vomiting     Neuro/Psych: Uneventful perioperative course   Sign Out Status: Preoperative baseline; Age appropriate mentation     Airway/Resp.: Uneventful perioperative course   Sign Out Status: Non labored breathing, age appropriate RR; Resp. Status within EXPECTED Parameters     CV: Uneventful perioperative course   Sign Out status: Appropriate BP and perfusion indices; Appropriate HR/Rhythm     Disposition:   Sign Out in:  PACU  Disposition:  Phase II; Home  Recovery Course: Uneventful  Follow-Up: Not required           Last Anesthesia Record Vitals:  CRNA VITALS  2019 1014 - 2019 1114      2019             Pulse:  87    SpO2:  100 %    Resp Rate (observed):  1  (Abnormal)     Resp Rate (set):  10          Last PACU/Preop Vitals:  Vitals:    19 1110 19 1125 19 1140   BP: 93/58 103/54 106/58   Pulse:      Resp: 16 16 16   Temp: 37.2  C (98.9  F)  37.1  C (98.8  F)   SpO2: 100% 100% 99%         Electronically Signed By: Robert Al MD, 2019, 2:57 PM

## 2019-04-05 NOTE — DISCHARGE INSTRUCTIONS
"[facility]  Home Care Following Anesthesia  For 24 hours after surgery:  1. Get plenty of rest.  A responsible adult must stay with you for at least 24 hours after you leave the surgery center.  2. Do not drive or use heavy equipment.  If you have weakness or tingling, don't drive or use heavy equipment until this feeling goes away.   3. Do not drink alcohol.   4. Avoid strenuous or risky activities.  Ask for help when climbing stairs.  5. You may feel lightheaded.  IF so, sit for a few minutes before standing.  Have someone help you get up.   6. If you have nausea (feel sick to your stomach): Drink only clear liquids such as apple juice, ginger ale, broth or 7-Up.  Rest may also help.  Be sure to drink enough fluids.  Move to a regular diet as you feel able.   7. You may have a slight fever.  Call the doctor if your fever is over 100 F (37.7 C) (taken under the tongue) or lasts longer than 24 hours.  8. You may have a dry mouth, a sore throat, muscle aches or trouble sleeping. These should go away after 24 hours.  9. Do not make important or legal decisions.   If you use hormonal birth control (such as the pill, patch, ring or implants):  You will need a second form of birth control for 7 days (condoms, a diaphragm or contraceptive foam).  While in the surgery center, you received a medicine called Sugammadex.  Hormonal birth control (such as the pill, patch, ring or implants) will not work as well for a week after taking this medicine.  Today you received a Marcaine or bupivacaine block to numb the nerves near your surgery site.  This is a block using local anesthetic or \"numbing\" medication injected around the nerves to anesthetize or \"numb\" the area supplied by those nerves.  This block is injected into the muscle layer near your surgical site.  The medication may numb the location where you had surgery for 6-18 hours, but may last up to 24 hours.  If your surgical site is " an arm or leg you should be careful with your affected limb, since it is possible to injure your limb without being aware of it due to the numbing.  Until full feeling returns, you should guard against bumping or hitting your limb, and avoid extreme hot or cold temperatures on the skin.  As the block wears off, the feeling will return as a tingling or prickly sensation near your surgical site.  You will experience more discomfort from your incision as the feeling returns.  You may want to take a pain pill (a narcotic or Tylenol if this was prescribed by your surgeon) when you start to experience mild pain before the pain beccomes more severe.  If your pain medications do not control your pain you should notifiy your surgeon.    Tips for taking pain medications  To get the best pain relief possible, remember these points:    Take pain medications as directed, before pain becomes severe.    Pain medication can upset your stomach: taking it with food may help.    Constipation is a common side effect of pain medication. Drink plenty of  fluids.    Eat foods high in fiber. Take a stool softener if recommended by your doctor or pharmacist.    Do not drink alcohol, drive or operate machinery while taking pain medications.    Ask about other ways to control pain, such as with heat, ice or relaxation.    Tylenol/Acetaminophen Consumption  To help encourage the safe use of acetaminophen, the makers of TYLENOL  have lowered the maximum daily dose for single-ingredient Extra Strength TYLENOL  (acetaminophen) products sold in the [place] from 8 pills per day (4,000 mg) to 6 pills per day (3,000 mg). The dosing interval has also changed from 2 pills every 4-6 hours to 2 pills every 6 hours.    If you feel your pain relief is insufficient, you may take Tylenol/Acetaminophen in addition to your narcotic pain medication.     Be careful not to exceed 3,000 mg of Tylenol/Acetaminophen in a 24 hour period from all sources.    If you are  taking extra strength Tylenol/acetaminophen (500 mg), the maximum dose is 6 tablets in 24 hours.    If you are taking regular strength acetaminophen (325 mg), the maximum dose is 9 tablets in 24 hours.    Tylenol 975mg given at 8:48am; next dose available after 2:48pm. Then follow package instructions.     Call a doctor for any of the followin. Signs of infection (fever, growing tenderness at the surgery site, a large amount of drainage or bleeding, severe pain, foul-smelling drainage, redness, swelling).  2. It has been over 8 to 10 hours since surgery and you are still not able to urinate (pass water).  3. Headache for over 24 hours.    Your doctor is:  Dr. Benito Blackwell, Colon Rectal: [phone]                  Or dial [phone] and ask for the resident on call for:  Colon Rectal  For emergency care, call the:  [place] [facility]:  [phone] (TTY for hearing impaired: [phone])      Anorectal Surgery Instructions    What can I expect after anorectal surgery?  Most anorectal procedures are done as outpatient surgery, and you go home the same day as the procedure. A few surgical procedures will require that you stay in the hospital for about one to three days. No matter where the procedure is done or how long or short it takes, these recommendations will help you heal and feel more comfortable.    Medicines:  The anal area is very sensitive; you can expect to have some pain for up to 2-4 weeks after the procedure. Your doctor will give you a prescription for one or more pain medications.    Take naprosyn 500 mg twice a day OR ibuprofen 600 mg four times a day     Take this on a regular basis (not as needed) following your surgery.     The drugs are best taken with food.  Do not take if it causes stomach upset or if you have a history of ulcers or gastritis. You can stop the naprosyn (or ibuprofen) or reduce the dose when you are feeling better.    DO NOT use naprosyn, ibuprofen, or  other similar agents (eg. Advil or Aleve) if you have inflammatory bowel disease (Ulcerative Colitis or Crohn's disease) or if your doctor as advised you against using these medications    Take acetominaphen (Tylenol) 650-1000 mg four times a day.     Take this on a regular basis (not as needed) following surgery for pain control.     Take the lower dose if you are >65 years old or have liver disease. The maximum dose of acetominaphen is 4000 mg a day. You can stop the acetaminophen or reduce the dose when you are feeling better.    It is important to realize that many narcotic pain relievers (including vicodin, percocet, tylenol #3) also have acetaminophen, and excessive doses of acetaminophen can be dangerous, so do not take these in addition to acetominaphen.  You may take narcotics that don't contain acetominaphen such as oxycodone.      Take oxycodone AS NEEDED in addition to the acetominaphen and naprosyn.      Because narcotics have side effects (including constipation), you should reduce your use of these medications as tolerated as your pain improves.    *In general, the best strategy is to take (if you are able to tolerate it) the tylenol and naprosen on a regular basis until your pain has largely gone away. You can take the narcotic pain medicine as needed in addition to the tylenol and ibuprofen. As your pain begins to lessen, you should cut back on your narcotic use while continuing to take your regular tylenol and naprosyn doses.      Refilling prescriptions. If you need additional pain medication, please call the triage nurse at [phone] during normal business hours (8 a.m. to 4 p.m., Monday though Friday) or have your pharmacy fax a refill request to [phone]. If you call after hours or on the weekends, the doctor on call may not know you personally and may not renew narcotic pain medication by phone. Call your primary care provider for all other medication refills.    Perineal  care:  External gauze dressing can be removed the morning after surgery. If you have an adhesive dressing stuck to the incision, DO NOT remove this.   Tub baths:    If possible, take a tub bath immediately after each bowel movement.     Baths should be take at least 3 times daily for the first week to 10 days following your procedure. You should soak in the tub for 10 to 15 minutes each time with water as warm as you can tolerate.     Even after you go back to work, it is a good idea to sit in the tub in the morning, after returning from work, and again in the evening before bedtime.    Bleeding/Infection:    You can expect to have some bleeding after bowel movements, but it should stop soon after you wipe.     Use a wet cloth or perianal pad (Tucks or Preparation H pads) to gently wipe the area after each bowel movement.    Do not rub the anal area or use a lot of pressure.    Using a spray bottle filled with warm water helps loosen any remaining stool. Blot gently with a soft dry cloth or tissue paper.    Infection around the anal opening is not very common. The anal area has excellent blood supply, which helps the area to heal. Bloody discharge after bowel movements is normal and may last 2 to 4 weeks after your surgery. However, if you bleed between bowel movements and cannot get it to stop, call the triage nurse immediately [phone].    Bowel function:  Take a fiber supplement such as Metamucil, which is over the counter. It is important to drink six to eight glasses of water or juice everyday when using fiber products.    If you do not have a bowel movement after 1-2 days:    Take Milk of Magnesia-2 tablespoons.       If there are no results, repeat this or add over the counter Miralax.      If you still do not have results, contact the clinic.     If there are no results, repeat this. Stop taking Milk of Magnesia or other laxatives if you begin to have diarrhea.    * Constipation will cause you to strain  when you have a bowel movement. The hard stool will be difficult to pass, will increase pain and bleeding, and will slow down healing.  Try to avoid constipation and/or diarrhea as this can make the pain and bleeding worse.    * It is important to have regular bowel movements at least every other day and to keep your stool soft.  A high fiber diet, including at least four servings of fruits or vegetables daily, will help to keep your bowel movements regular and soft.    Activity:  After your procedure, there are no restrictions on your activity     except restrictions surrounding being on narcotics and in pain, such as no heavy machine operating or driving.     You may walk, climb stairs, ride in a car, and sit as tolerated.     It is helpful to avoid sitting in one position for long periods (2 or more hours).    After some surgeries, you may be told not to perform any lifting (more than 10 pounds) for several weeks after surgery.    When to call:  When do I need to call the doctor or triage nurse?    If you experience any of the problems listed here, call our triage nurse during business hours ([phone]).     The nurse will help you with your problem or have the doctor call you.     After hours and on weekends, please call the main hospital number ([phone]) and ask for the colon and rectal surgery person on call.     Some is available to help you 24 hours a day, seven days a week.    Call for:   ? Fever greater than 101 degrees   ? Chills   ? Foul-smelling drainage   ? Nausea and vomiting   ? Diarrhea - greater than 3 water stools in 24 hours   ? Constipation - no bowel movement after 3 days   ? Severe bleeding that does not stop soon after a bowel movement   ? Problems with the incision, including increased pain, swelling, or redness

## 2019-04-05 NOTE — TELEPHONE ENCOUNTER
Returned patient's call to advise start Flagyl tomorrow-4/6/19 TID. Patient stated understanding and thankful for call.

## 2019-04-09 LAB — COPATH REPORT: NORMAL

## 2019-04-17 ENCOUNTER — OFFICE VISIT (OUTPATIENT)
Dept: SURGERY | Facility: CLINIC | Age: 34
End: 2019-04-17
Payer: COMMERCIAL

## 2019-04-17 VITALS
WEIGHT: 142.5 LBS | SYSTOLIC BLOOD PRESSURE: 115 MMHG | OXYGEN SATURATION: 99 % | HEIGHT: 66 IN | HEART RATE: 83 BPM | BODY MASS INDEX: 22.9 KG/M2 | DIASTOLIC BLOOD PRESSURE: 76 MMHG

## 2019-04-17 DIAGNOSIS — R19.7 DIARRHEA, UNSPECIFIED TYPE: Primary | ICD-10-CM

## 2019-04-17 ASSESSMENT — MIFFLIN-ST. JEOR: SCORE: 1368.13

## 2019-04-17 ASSESSMENT — PAIN SCALES - GENERAL: PAINLEVEL: NO PAIN (0)

## 2019-04-17 NOTE — NURSING NOTE
"Chief Complaint   Patient presents with     Surgical Followup     Date of surgery 4/5/2019.       Vitals:    04/17/19 1633   BP: 115/76   BP Location: Left arm   Patient Position: Sitting   Cuff Size: Adult Regular   Pulse: 83   SpO2: 99%   Weight: 142 lb 8 oz   Height: 5' 6\"       Body mass index is 23 kg/m .      Rebecca English, EMT                      "

## 2019-04-17 NOTE — PROGRESS NOTES
"Colon and Rectal Surgery Postoperative Clinic Note    RE: Linda Madrid  : 1985  KEVIN: 2019    Linda Madrid is a very pleasant 33 year old female status post examination under anesthesia with Botox injection, hemorrhoidectomy x2, and ligation of internal hemorrhoid x1 on 2019 with Dr. Blackwell.  She presents today for follow-up.    Interval history: Yumiko has been doing well since returning home.  She has been able to return to work.  She is doing sitz baths about once a day and applying silver Silvadene cream.  She is having watery diarrhea.  This was fairly severe initially but is now about 3 times a day.  She continues on magnesium and MiraLAX as she had significant constipation for surgery.  She finished her antibiotics last week but feels that this changed her stools.  She has had some bleeding but states that this is decreasing in amount.  She additionally has some perianal itching.    Physical Examination: Exam was chaperoned by Rebecca English, EMT  /76 (BP Location: Left arm, Patient Position: Sitting, Cuff Size: Adult Regular)   Pulse 83   Ht 5' 6\"   Wt 142 lb 8 oz   SpO2 99%   BMI 23.00 kg/m    General: Alert, oriented, in no acute distress, sitting comfortably  HEENT: mucous membranes moist  Perianal external examination:  Surgical sites in the right and left perianal positions remain open with good granulation tissue.  No erythema or drainage.    Digital rectal examination: Was deferred.    Anoscopy: Was deferred.    Assessment/Plan:  33 year old female status post examination under anesthesia with Botox injection, hemorrhoidectomy x2, and ligation of internal hemorrhoid x1 on 2019 with Dr. Blackwell.  She is recovering well from surgery.  We will check her stool for C. difficile and if this is negative we will have her follow-up with gastroenterology to adjust her bowel medications.  Would like to avoid constipation and she has a history of chronic constipation " but can likely titrate the MiraLAX and magnesium to avoid watery stools.  Continue with sitz baths daily.  Can use calmoseptine for perianal itching.  We will have her follow-up in clinic in 3-4 weeks unless she feels completely healed at that time.  Encouraged her to contact the clinic in the meantime with any questions or concerns. Patient's questions were answered to her stated satisfaction and she is in agreement with this plan.    Medical history:  Past Medical History:   Diagnosis Date     Asthma        Surgical history:  Past Surgical History:   Procedure Laterality Date     EXAM UNDER ANESTHESIA ANUS N/A 4/5/2019    Procedure: Examination Under Anesthesia;  Surgeon: Benito Blackwell MD;  Location: [facility] OR     HEMORRHOIDECTOMY BANDING N/A 4/5/2019    Procedure: Hemorrhoidectomy x2  ligation of internal hemorrhoid x 1;  Surgeon: Benito Blackwell MD;  Location: [facility] OR     INJECT BOTOX N/A 4/5/2019    Procedure: Botox Injection;  Surgeon: Benito Blackwell MD;  Location: [facility] OR       Problem list:    Patient Active Problem List    Diagnosis Date Noted     Slow transit constipation 06/22/2016     Priority: Medium       Medications:  Current Outpatient Medications   Medication Sig Dispense Refill     acetaminophen (TYLENOL) 325 MG tablet Take 2 tablets (650 mg) by mouth 4 times daily for 14 days Alternate with ibuprofen (ADVIL/MOTRIN), IF ordered. 100 tablet 0     ALBUTEROL 90 MCG/ACT IN AERS PRN       Biotin 10 MG CAPS        Cetirizine HCl (ZYRTEC ALLERGY PO) Take by mouth daily PRN       COMPOUND (CMPD RX) - PHARMACY TO MIX COMPOUNDED MEDICATION Apply a pea sized amount three times a day for 8 weeks. 30 g 0     Fexofenadine HCl (ALLEGRA PO) Take by mouth daily       FLUTICASONE PROPIONATE, NASAL, NA        fluticasone-vilanterol (BREO ELLIPTA) 200-25 MCG/INH inhaler Inhale 1 puff into the lungs daily       ibuprofen (ADVIL/MOTRIN) 800 MG tablet Take 1 tablet (800 mg) by mouth 3 times daily  for 14 days Alternate with acetaminophen (TYLENOL), IF ordered. 42 tablet 0     JUNEL FE 1.5/30 1.5-30 MG-MCG OR TABS 1 TABLET DAILY       levalbuterol (XOPENEX HFA) 45 MCG/ACT inhaler Inhale 2 puffs into the lungs every 4 hours as needed for shortness of breath / dyspnea or wheezing       magnesium oxide 400 MG CAPS Take 400 mg by mouth daily 90 capsule 3     melatonin 3-10 MG TABS        OMEPRAZOLE PO        oxyCODONE (ROXICODONE) 5 MG tablet Take 1-2 tablets (5-10 mg) by mouth every 6 hours as needed for severe pain 30 tablet 0     polyethylene glycol (MIRALAX) powder Take 2 capfuls by mouth daily        polyethylene glycol (MIRALAX/GLYCOLAX) powder Take 17 g (1 capful) by mouth 2 times daily 500 g 0     Vitamin D, Cholecalciferol, 1000 units CAPS        diltiazem 2% in PLO cream, FV COMPOUNDED, 2% GEL To anal opening three times daily.  Use a pea-sized amount.  Store at room temperature. (Patient not taking: Reported on 2/27/2019) 60 g 2       Allergies:  Allergies   Allergen Reactions     Milk Protein Extract Other (See Comments)     Intense pain within 15 minutes.      Ancef [Cefazolin Sodium]      Penicillins      Shellfish Allergy      Vicodin [Hydrocodone-Acetaminophen]        Family history:  Family History   Problem Relation Age of Onset     Cancer - colorectal No family hx of      Other - See Comments No family hx of         autoimmune disease       Social history:  Social History     Tobacco Use     Smoking status: Never Smoker     Smokeless tobacco: Never Used   Substance Use Topics     Alcohol use: Yes     Marital status: single.  Occupation: speech pathologist.    Nursing Notes:   Rebecca English, EMT  4/17/2019  4:35 PM  Signed  Chief Complaint   Patient presents with     Surgical Followup     Date of surgery 4/5/2019.       Vitals:    04/17/19 1633   BP: 115/76   BP Location: Left arm   Patient Position: Sitting   Cuff Size: Adult Regular   Pulse: 83   SpO2: 99%   Weight: 142 lb 8 oz   Height: 5'  "6\"       Body mass index is 23 kg/m .      MARYJANE Tony                       Total face to face time was 20 minutes, >50% counseling.  This is a postop visit.    HELEN Freitas, NP-C  Colon and Rectal Surgery  [facility]    This note was created using speech recognition software and may contain unintended word substitutions.    "

## 2019-04-18 ENCOUNTER — HOSPITAL ENCOUNTER (OUTPATIENT)
Dept: LAB | Facility: CLINIC | Age: 34
Discharge: HOME | End: 2019-04-18
Attending: NURSE PRACTITIONER | Admitting: NURSE PRACTITIONER
Payer: COMMERCIAL

## 2019-04-18 DIAGNOSIS — R19.7 DIARRHEA, UNSPECIFIED TYPE: ICD-10-CM

## 2019-04-18 LAB
C DIFF TOX B STL QL: NEGATIVE
SPECIMEN SOURCE: NORMAL

## 2019-04-18 PROCEDURE — 87493 C DIFF AMPLIFIED PROBE: CPT | Performed by: NURSE PRACTITIONER

## 2019-05-14 ENCOUNTER — TELEPHONE (OUTPATIENT)
Dept: SURGERY | Facility: CLINIC | Age: 34
End: 2019-05-14

## 2019-05-15 ENCOUNTER — OFFICE VISIT (OUTPATIENT)
Dept: SURGERY | Facility: CLINIC | Age: 34
End: 2019-05-15
Payer: COMMERCIAL

## 2019-05-15 VITALS
WEIGHT: 143.1 LBS | SYSTOLIC BLOOD PRESSURE: 112 MMHG | DIASTOLIC BLOOD PRESSURE: 79 MMHG | OXYGEN SATURATION: 100 % | BODY MASS INDEX: 23 KG/M2 | HEIGHT: 66 IN

## 2019-05-15 DIAGNOSIS — K59.09 OTHER CONSTIPATION: Primary | ICD-10-CM

## 2019-05-15 ASSESSMENT — PAIN SCALES - GENERAL: PAINLEVEL: NO PAIN (0)

## 2019-05-15 ASSESSMENT — MIFFLIN-ST. JEOR: SCORE: 1370.85

## 2019-05-15 NOTE — NURSING NOTE
"Chief Complaint   Patient presents with     Surgical Followup     Post op.       Vitals:    05/15/19 1733   BP: 112/79   BP Location: Left arm   Patient Position: Sitting   Cuff Size: Adult Regular   SpO2: 100%   Weight: 143 lb 1.6 oz   Height: 5' 6\"       Body mass index is 23.1 kg/m .      Rebecca English, EMT                      "

## 2019-05-15 NOTE — PROGRESS NOTES
"Colon and Rectal Surgery Follow-Up Clinic Note    RE: Linda Madrid  : 1985  KEVIN: 5/15/2019    Linda Madrid is a very pleasant 33 year old female status post examination under anesthesia with Botox injection, hemorrhoidectomy x2, and ligation of internal hemorrhoid x1 on 2019 with Dr. Blackwell.  I saw her one month ago in follow up and she presents today with some additional concerns.     Interval history: Linda reports that her pain has completely resolved.  However, she has noticed a very small amount of leakage of what she thinks is mucus.  She notices a few small spots on her underwear.  This is new since surgery.  She denies any incontinence of stool.  She also has found that she unknowingly passes stool when voiding on occasion.  This is a very small amount but is again new for her.  Her stools have been fairly loose with daily MiraLAX and magnesium.  She had one day where she forgot her MiraLAX and became constipated for several days.  She would like to meet with a gastroenterologist for further recommendations on her chronic constipation she does not feel she can find a good balance between constipation and diarrhea.    Physical Examination: Exam was chaperoned by Rebecca English, EMT   /79 (BP Location: Left arm, Patient Position: Sitting, Cuff Size: Adult Regular)   Ht 5' 6\"   Wt 143 lb 1.6 oz   SpO2 100%   BMI 23.10 kg/m    General: alert, oriented, in no acute distress, sitting comfortably  HEENT:mucous membranes moist  Perianal external examination:  Two very superficial wounds in the right and left anterior positions. No purulent drainage or erythema    Digital rectal examination: Was deferred.    Anoscopy: Was deferred.    Assessment/Plan: 33 year old female  status post examination under anesthesia with Botox injection, hemorrhoidectomy x2, and ligation of internal hemorrhoid x1 on 2019 with Dr. Blackwell. Her leakage is quite small and certainly could be from " surgery. This may still improve with healing. I think that it is also likely related to her loose stools. She has chronic constipation and is controlling this with Miralax and Mg but now has diarrhea. She has tried altering the doses but then becomes constipated. She is requesting to meet with gastroenterology again to try to manage this. I will have her follow up with our clinic with any persistent leakage after the next few months or with any return of pain. Patient's questions were answered to her stated satisfaction and she is in agreement with this plan.    Medical history:  Past Medical History:   Diagnosis Date     Asthma        Surgical history:  Past Surgical History:   Procedure Laterality Date     EXAM UNDER ANESTHESIA ANUS N/A 4/5/2019    Procedure: Examination Under Anesthesia;  Surgeon: Benito Blackwell MD;  [facility]     HEMORRHOIDECTOMY BANDING N/A 4/5/2019    Procedure: Hemorrhoidectomy x2  ligation of internal hemorrhoid x 1;  Surgeon: Benito Blackwell MD;  [facility]     INJECT BOTOX N/A 4/5/2019    Procedure: Botox Injection;  Surgeon: Benito Blackwell MD;  Location: [facility]       Problem list:  Patient Active Problem List    Diagnosis Date Noted     Slow transit constipation 06/22/2016     Priority: Medium       Medications:  Current Outpatient Medications   Medication Sig Dispense Refill     Biotin 10 MG CAPS        Cetirizine HCl (ZYRTEC ALLERGY PO) Take by mouth daily PRN       Fexofenadine HCl (ALLEGRA PO) Take by mouth daily       FLUTICASONE PROPIONATE, NASAL, NA        JUNEL FE 1.5/30 1.5-30 MG-MCG OR TABS 1 TABLET DAILY       levalbuterol (XOPENEX HFA) 45 MCG/ACT inhaler Inhale 2 puffs into the lungs every 4 hours as needed for shortness of breath / dyspnea or wheezing       magnesium oxide 400 MG CAPS Take 400 mg by mouth daily 90 capsule 3     melatonin 3-10 MG TABS        OMEPRAZOLE PO        polyethylene glycol (MIRALAX) powder Take 2 capfuls by  "mouth daily        Vitamin D, Cholecalciferol, 1000 units CAPS        COMPOUND (CMPD RX) - PHARMACY TO MIX COMPOUNDED MEDICATION Apply a pea sized amount three times a day for 8 weeks. (Patient not taking: Reported on 5/15/2019) 30 g 0     fluticasone-vilanterol (BREO ELLIPTA) 200-25 MCG/INH inhaler Inhale 1 puff into the lungs daily         Allergies:  Allergies   Allergen Reactions     Milk Protein Extract Other (See Comments)     Intense pain within 15 minutes.      Ancef [Cefazolin Sodium]      Penicillins      Shellfish Allergy      Vicodin [Hydrocodone-Acetaminophen]      Diltiazem      Diltiazem 2% in  PLO cream: Burning         Family history:  Family History   Problem Relation Age of Onset     Cancer - colorectal No family hx of      Other - See Comments No family hx of         autoimmune disease       Social history:  Social History     Tobacco Use     Smoking status: Never Smoker     Smokeless tobacco: Never Used   Substance Use Topics     Alcohol use: Yes     Marital status: single.  Occupation: speech pathologist.    Nursing Notes:   Rebecac English EMT  5/15/2019  5:39 PM  Signed  Chief Complaint   Patient presents with     Surgical Followup     Post op.       Vitals:    05/15/19 1733   BP: 112/79   BP Location: Left arm   Patient Position: Sitting   Cuff Size: Adult Regular   SpO2: 100%   Weight: 143 lb 1.6 oz   Height: 5' 6\"       Body mass index is 23.1 kg/m .      MARYJANE Tony                      Total face to face time was 15 minutes, >50% counseling.   This is a postop visit.    Linh Bueno, NP-C  Colon and Rectal Surgery  [facility]    "

## 2019-05-15 NOTE — LETTER
"5/15/2019       RE: Linda Madrid  [address]  [place] [facility] [address]     Dear Colleague,    Thank you for referring your patient, Linda Madrid, to the [facility] COLON AND RECTAL SURGERY at [facility]. Please see a copy of my visit note below.    Colon and Rectal Surgery Follow-Up Clinic Note    RE: Linda Madrid  : 1985  KEVIN: 5/15/2019    Linda Madrid is a very pleasant 33 year old female status post examination under anesthesia with Botox injection, hemorrhoidectomy x2, and ligation of internal hemorrhoid x1 on 2019 with Dr. Blackwell.   I saw her one month ago in follow up and she presents today with some additional concerns.     Interval history: Linda reports that her pain has completely resolved.  However, she has noticed a very small amount of leakage of what she thinks is mucus.  She notices a few small spots on her underwear.  This is new since surgery.  She denies any incontinence of stool.  She also has found that she unknowingly passes stool when voiding on occasion.  This is a very small amount but is again new for her.  Her stools have been fairly loose with daily MiraLAX and magnesium.  She had one day where she forgot her MiraLAX and became constipated for several days.  She would like to meet with a gastroenterologist for further recommendations on her chronic constipation she does not feel she can find a good balance between constipation and diarrhea.    Physical Examination: Exam was chaperoned by Rebecca English, EMT   /79 (BP Location: Left arm, Patient Position: Sitting, Cuff Size: Adult Regular)   Ht 5' 6\"   Wt 143 lb 1.6 oz   SpO2 100%   BMI 23.10 kg/m     General: alert, oriented, in no acute distress, sitting comfortably  HEENT:mucous membranes moist  Perianal external examination:  Two very superficial wounds in the right and left anterior positions. No purulent drainage or erythema    Digital rectal " examination: Was deferred.    Anoscopy: Was deferred.    Assessment/Plan: 33 year old female  status post examination under anesthesia with Botox injection, hemorrhoidectomy x2, and ligation of internal hemorrhoid x1 on 4/5/2019 with Dr. Blackwell. Her leakage is quite small and certainly could be from surgery. This may still improve with healing. I think that it is also likely related to her loose stools. She has chronic constipation and is controlling this with Miralax and Mg but now has diarrhea. She has tried altering the doses but then becomes constipated. She is requesting to meet with gastroenterology again to try to manage this. I will have her follow up with our clinic with any persistent leakage after the next few months or with any return of pain. Patient's questions were answered to her stated satisfaction and she is in agreement with this plan.    Medical history:  Past Medical History:   Diagnosis Date     Asthma        Surgical history:  Past Surgical History:   Procedure Laterality Date     EXAM UNDER ANESTHESIA ANUS N/A 4/5/2019    Procedure: Examination Under Anesthesia;  Surgeon: Bneito Blackwell MD;  [facility]     HEMORRHOIDECTOMY BANDING N/A 4/5/2019    Procedure: Hemorrhoidectomy x2  ligation of internal hemorrhoid x 1;  Surgeon: Benito Blackwell MD;  [facility]     INJECT BOTOX N/A 4/5/2019    Procedure: Botox Injection;  Surgeon: Benito Blackwell MD;  [facility]       Problem list:  Patient Active Problem List    Diagnosis Date Noted     Slow transit constipation 06/22/2016     Priority: Medium       Medications:  Current Outpatient Medications   Medication Sig Dispense Refill     Biotin 10 MG CAPS        Cetirizine HCl (ZYRTEC ALLERGY PO) Take by mouth daily PRN       Fexofenadine HCl (ALLEGRA PO) Take by mouth daily       FLUTICASONE PROPIONATE, NASAL, NA        JUNEL FE 1.5/30 1.5-30 MG-MCG OR TABS 1 TABLET DAILY       levalbuterol (XOPENEX HFA) 45  "MCG/ACT inhaler Inhale 2 puffs into the lungs every 4 hours as needed for shortness of breath / dyspnea or wheezing       magnesium oxide 400 MG CAPS Take 400 mg by mouth daily 90 capsule 3     melatonin 3-10 MG TABS        OMEPRAZOLE PO        polyethylene glycol (MIRALAX) powder Take 2 capfuls by mouth daily        Vitamin D, Cholecalciferol, 1000 units CAPS        COMPOUND (CMPD RX) - PHARMACY TO MIX COMPOUNDED MEDICATION Apply a pea sized amount three times a day for 8 weeks. (Patient not taking: Reported on 5/15/2019) 30 g 0     fluticasone-vilanterol (BREO ELLIPTA) 200-25 MCG/INH inhaler Inhale 1 puff into the lungs daily         Allergies:  Allergies   Allergen Reactions     Milk Protein Extract Other (See Comments)     Intense pain within 15 minutes.      Ancef [Cefazolin Sodium]      Penicillins      Shellfish Allergy      Vicodin [Hydrocodone-Acetaminophen]      Diltiazem      Diltiazem 2% in  PLO cream: Burning         Family history:  Family History   Problem Relation Age of Onset     Cancer - colorectal No family hx of      Other - See Comments No family hx of         autoimmune disease       Social history:  Social History     Tobacco Use     Smoking status: Never Smoker     Smokeless tobacco: Never Used   Substance Use Topics     Alcohol use: Yes     Marital status: single.  Occupation: speech pathologist.    Nursing Notes:   Rebecca English EMT  5/15/2019  5:39 PM  Signed  Chief Complaint   Patient presents with     Surgical Followup     Post op.       Vitals:    05/15/19 1733   BP: 112/79   BP Location: Left arm   Patient Position: Sitting   Cuff Size: Adult Regular   SpO2: 100%   Weight: 143 lb 1.6 oz   Height: 5' 6\"       Body mass index is 23.1 kg/m .      MARYJANE Tony                      Total face to face time was 15 minutes, >50% counseling.   This is a postop visit.    Linh Bueno NP-C  Colon and Rectal Surgery  [facility]      Again, " thank you for allowing me to participate in the care of your patient.      Sincerely,    HELEN Mcdermott CNP

## 2019-10-03 ENCOUNTER — HEALTH MAINTENANCE LETTER (OUTPATIENT)
Age: 34
End: 2019-10-03

## 2021-01-15 ENCOUNTER — HEALTH MAINTENANCE LETTER (OUTPATIENT)
Age: 36
End: 2021-01-15

## 2021-09-05 ENCOUNTER — HEALTH MAINTENANCE LETTER (OUTPATIENT)
Age: 36
End: 2021-09-05

## 2021-11-24 ENCOUNTER — HOSPITAL ENCOUNTER (OUTPATIENT)
Facility: CLINIC | Age: 36
End: 2021-11-24
Payer: COMMERCIAL

## 2022-02-07 NOTE — NURSING NOTE
"Chief Complaint   Patient presents with     Consult     Rectal bleeding, hx of anal fissure and hemmroids       Vitals:    02/27/19 1626   BP: 112/74   Pulse: 63   Temp: 97.7  F (36.5  C)   TempSrc: Oral   SpO2: 98%   Weight: 66 kg (145 lb 9.6 oz)   Height: 1.676 m (5' 6\")       Body mass index is 23.5 kg/m .      Adria Powers, EMT on 2/27/2019 at 4:31 PM                       "
No

## 2022-02-20 ENCOUNTER — HEALTH MAINTENANCE LETTER (OUTPATIENT)
Age: 37
End: 2022-02-20

## 2022-10-23 ENCOUNTER — HEALTH MAINTENANCE LETTER (OUTPATIENT)
Age: 37
End: 2022-10-23

## 2023-04-02 ENCOUNTER — HEALTH MAINTENANCE LETTER (OUTPATIENT)
Age: 38
End: 2023-04-02

## 2025-02-23 ENCOUNTER — HEALTH MAINTENANCE LETTER (OUTPATIENT)
Age: 40
End: 2025-02-23

## 2025-02-26 NOTE — TELEPHONE ENCOUNTER
DIAGNOSIS: Hole in right hip   APPOINTMENT DATE: 3/20/25   NOTES STATUS DETAILS   OFFICE NOTE from referring provider Care Everywhere ED Referral    DISCHARGE REPORT from the ER Care Everywhere 2/8/25 Jona Locke MD  @[facility] ED     MEDICATION LIST Internal    CT SCAN In process [facility]*  2/8/25 CT Abdomen Pelvis       Records Requested   February 26, 2025 11:34 AM   [id number]   Facility  [facility]   Outcome 11:36 am Sent request for imaging to be pushed to PACS. RANDY

## 2025-03-20 ENCOUNTER — PRE VISIT (OUTPATIENT)
Dept: ORTHOPEDICS | Facility: CLINIC | Age: 40
End: 2025-03-20

## 2025-04-29 NOTE — LETTER
"2019       RE: Linda Madrid  [address]  [place] [address]     Dear Colleague,    Thank you for referring your patient, Linda Madrid, to the [facility] COLON AND RECTAL SURGERY at [facility]. Please see a copy of my visit note below.    Colon and Rectal Surgery Postoperative Clinic Note    RE: Linda Madrid  : 1985  KEVIN: 2019    Linda Madrid is a very pleasant 33 year old female status post examination under anesthesia with Botox injection, hemorrhoidectomy x2, and ligation of internal hemorrhoid x1 on 2019 with Dr. Blackwell.  She presents today for follow-up.    Interval history: Yumiko has been doing well since returning home.  She has been able to return to work.  She is doing sitz baths about once a day and applying silver Silvadene cream.  She is having watery diarrhea.  This was fairly severe initially but is now about 3 times a day.  She continues on magnesium and MiraLAX as she had significant constipation for surgery.  She finished her antibiotics last week but feels that this changed her stools.  She has had some bleeding but states that this is decreasing in amount.  She additionally has some perianal itching.    Physical Examination: Exam was chaperoned by Rebecca English, EMT  /76 (BP Location: Left arm, Patient Position: Sitting, Cuff Size: Adult Regular)   Pulse 83   Ht 5' 6\"   Wt 142 lb 8 oz   SpO2 99%   BMI 23.00 kg/m     General: Alert, oriented, in no acute distress, sitting comfortably  HEENT: mucous membranes moist  Perianal external examination:  Surgical sites in the right and left perianal positions remain open with good granulation tissue.  No erythema or drainage.    Digital rectal examination: Was deferred.    Anoscopy: Was deferred.    Assessment/Plan:  33 year old female status post examination under anesthesia with Botox injection, hemorrhoidectomy x2, and ligation of internal hemorrhoid x1 " on 4/5/2019 with Dr. Blackwell.  She is recovering well from surgery.  We will check her stool for C. difficile and if this is negative we will have her follow-up with gastroenterology to adjust her bowel medications.  Would like to avoid constipation and she has a history of chronic constipation but can likely titrate the MiraLAX and magnesium to avoid watery stools.  Continue with sitz baths daily.  Can use calmoseptine for perianal itching.  We will have her follow-up in clinic in 3-4 weeks unless she feels completely healed at that time.  Encouraged her to contact the clinic in the meantime with any questions or concerns. Patient's questions were answered to her stated satisfaction and she is in agreement with this plan.    Medical history:  Past Medical History:   Diagnosis Date     Asthma        Surgical history:  Past Surgical History:   Procedure Laterality Date     EXAM UNDER ANESTHESIA ANUS N/A 4/5/2019    Procedure: Examination Under Anesthesia;  Surgeon: Benito Blackwell MD;  [facility]     HEMORRHOIDECTOMY BANDING N/A 4/5/2019    Procedure: Hemorrhoidectomy x2  ligation of internal hemorrhoid x 1;  Surgeon: Benito Blackwell MD;  [facility]     INJECT BOTOX N/A 4/5/2019    Procedure: Botox Injection;  Surgeon: Benito Blackwell MD;  [facility]: [facility] OR       Problem list:    Patient Active Problem List    Diagnosis Date Noted     Slow transit constipation 06/22/2016     Priority: Medium       Medications:  Current Outpatient Medications   Medication Sig Dispense Refill     acetaminophen (TYLENOL) 325 MG tablet Take 2 tablets (650 mg) by mouth 4 times daily for 14 days Alternate with ibuprofen (ADVIL/MOTRIN), IF ordered. 100 tablet 0     ALBUTEROL 90 MCG/ACT IN AERS PRN       Biotin 10 MG CAPS        Cetirizine HCl (ZYRTEC ALLERGY PO) Take by mouth daily PRN       COMPOUND (CMPD RX) - PHARMACY TO MIX COMPOUNDED MEDICATION Apply a pea sized amount three times a day for 8  weeks. 30 g 0     Fexofenadine HCl (ALLEGRA PO) Take by mouth daily       FLUTICASONE PROPIONATE, NASAL, NA        fluticasone-vilanterol (BREO ELLIPTA) 200-25 MCG/INH inhaler Inhale 1 puff into the lungs daily       ibuprofen (ADVIL/MOTRIN) 800 MG tablet Take 1 tablet (800 mg) by mouth 3 times daily for 14 days Alternate with acetaminophen (TYLENOL), IF ordered. 42 tablet 0     JUNEL FE 1.5/30 1.5-30 MG-MCG OR TABS 1 TABLET DAILY       levalbuterol (XOPENEX HFA) 45 MCG/ACT inhaler Inhale 2 puffs into the lungs every 4 hours as needed for shortness of breath / dyspnea or wheezing       magnesium oxide 400 MG CAPS Take 400 mg by mouth daily 90 capsule 3     melatonin 3-10 MG TABS        OMEPRAZOLE PO        oxyCODONE (ROXICODONE) 5 MG tablet Take 1-2 tablets (5-10 mg) by mouth every 6 hours as needed for severe pain 30 tablet 0     polyethylene glycol (MIRALAX) powder Take 2 capfuls by mouth daily        polyethylene glycol (MIRALAX/GLYCOLAX) powder Take 17 g (1 capful) by mouth 2 times daily 500 g 0     Vitamin D, Cholecalciferol, 1000 units CAPS        diltiazem 2% in PLO cream, FV COMPOUNDED, 2% GEL To anal opening three times daily.  Use a pea-sized amount.  Store at room temperature. (Patient not taking: Reported on 2/27/2019) 60 g 2       Allergies:  Allergies   Allergen Reactions     Milk Protein Extract Other (See Comments)     Intense pain within 15 minutes.      Ancef [Cefazolin Sodium]      Penicillins      Shellfish Allergy      Vicodin [Hydrocodone-Acetaminophen]        Family history:  Family History   Problem Relation Age of Onset     Cancer - colorectal No family hx of      Other - See Comments No family hx of         autoimmune disease       Social history:  Social History     Tobacco Use     Smoking status: Never Smoker     Smokeless tobacco: Never Used   Substance Use Topics     Alcohol use: Yes     Marital status: single.  Occupation: speech pathologist.    Nursing Notes:   Rebecca English, EMT   "4/17/2019  4:35 PM  Signed  Chief Complaint   Patient presents with     Surgical Followup     Date of surgery 4/5/2019.       Vitals:    04/17/19 1633   BP: 115/76   BP Location: Left arm   Patient Position: Sitting   Cuff Size: Adult Regular   Pulse: 83   SpO2: 99%   Weight: 142 lb 8 oz   Height: 5' 6\"       Body mass index is 23 kg/m .      MARYJANE Tony        Total face to face time was 20 minutes, >50% counseling.  This is a postop visit.      This note was created using speech recognition software and may contain unintended word substitutions.      Again, thank you for allowing me to participate in the care of your patient.      Sincerely,    HELEN Mcdermott CNP      " seen by ED staff HTN . Has 29yo son and grandchild. Unemployed

## (undated) DEVICE — SYR 01ML 27GA 0.5" NDL TBC 309623

## (undated) DEVICE — PREP POVIDONE IODINE SOLUTION 10% 120ML

## (undated) DEVICE — STRAP KNEE/BODY 31143004

## (undated) DEVICE — LINEN TOWEL PACK X5 5464

## (undated) DEVICE — ESU GROUND PAD ADULT W/CORD E7507

## (undated) DEVICE — DRSG GAUZE 4X4" TRAY 6939

## (undated) DEVICE — PACK RECTAL KIT CUSTOM ASC

## (undated) DEVICE — SOL WATER IRRIG 1000ML BOTTLE 2F7114

## (undated) DEVICE — LINEN TOWEL PACK X6 WHITE 5487

## (undated) DEVICE — SYR 10ML LL W/O NDL

## (undated) DEVICE — TAPE MEDIPORE 4"X10YD 2964

## (undated) DEVICE — PAD CHUX UNDERPAD 30X30"

## (undated) DEVICE — SOL WATER IRRIG 500ML BOTTLE 2F7113

## (undated) DEVICE — GLOVE PROTEXIS MICRO 7.5  2D73PM75

## (undated) DEVICE — NDL BLUNT 18GA 1" W/O FILTER 305181

## (undated) DEVICE — GLOVE PROTEXIS BLUE W/NEU-THERA 8.0  2D73EB80

## (undated) DEVICE — SUCTION MANIFOLD NEPTUNE 2 SYS 1 PORT 702-025-000

## (undated) DEVICE — SOL BENZOIN 0.5OZ

## (undated) DEVICE — SOL NACL 0.9% 10ML VIAL 0409-4888-02

## (undated) DEVICE — PANTIES MESH LG/XLG 2PK 706M2

## (undated) DEVICE — SU VICRYL 3-0 SH 27" J316H

## (undated) DEVICE — TAPE DURAPORE 3" SILK 1538-3

## (undated) DEVICE — RX BACITRACIN OINTMENT 0.9G 1/32OZ CUR001109

## (undated) DEVICE — SPECIMEN CONTAINER 5OZ STERILE 2600SA

## (undated) RX ORDER — ONDANSETRON 2 MG/ML
INJECTION INTRAMUSCULAR; INTRAVENOUS
Status: DISPENSED
Start: 2019-04-05

## (undated) RX ORDER — CIPROFLOXACIN 2 MG/ML
INJECTION, SOLUTION INTRAVENOUS
Status: DISPENSED
Start: 2019-04-05

## (undated) RX ORDER — FENTANYL CITRATE 50 UG/ML
INJECTION, SOLUTION INTRAMUSCULAR; INTRAVENOUS
Status: DISPENSED
Start: 2019-04-05

## (undated) RX ORDER — OXYCODONE HYDROCHLORIDE 5 MG/1
TABLET ORAL
Status: DISPENSED
Start: 2019-04-05

## (undated) RX ORDER — DEXAMETHASONE SODIUM PHOSPHATE 4 MG/ML
INJECTION, SOLUTION INTRA-ARTICULAR; INTRALESIONAL; INTRAMUSCULAR; INTRAVENOUS; SOFT TISSUE
Status: DISPENSED
Start: 2019-04-05

## (undated) RX ORDER — PROPOFOL 10 MG/ML
INJECTION, EMULSION INTRAVENOUS
Status: DISPENSED
Start: 2019-04-05

## (undated) RX ORDER — SILVER SULFADIAZINE 10 MG/G
CREAM TOPICAL
Status: DISPENSED
Start: 2019-04-05

## (undated) RX ORDER — DEXAMETHASONE SODIUM PHOSPHATE 10 MG/ML
INJECTION, SOLUTION INTRAMUSCULAR; INTRAVENOUS
Status: DISPENSED
Start: 2019-04-05

## (undated) RX ORDER — EPINEPHRINE 1 MG/ML
INJECTION, SOLUTION, CONCENTRATE INTRAVENOUS
Status: DISPENSED
Start: 2019-04-05

## (undated) RX ORDER — ACETAMINOPHEN 325 MG/1
TABLET ORAL
Status: DISPENSED
Start: 2019-04-05

## (undated) RX ORDER — LIDOCAINE HYDROCHLORIDE 10 MG/ML
INJECTION, SOLUTION EPIDURAL; INFILTRATION; INTRACAUDAL; PERINEURAL
Status: DISPENSED
Start: 2019-04-05

## (undated) RX ORDER — BUPIVACAINE HYDROCHLORIDE 2.5 MG/ML
INJECTION, SOLUTION EPIDURAL; INFILTRATION; INTRACAUDAL
Status: DISPENSED
Start: 2019-04-05